# Patient Record
Sex: FEMALE | Employment: UNEMPLOYED | URBAN - METROPOLITAN AREA
[De-identification: names, ages, dates, MRNs, and addresses within clinical notes are randomized per-mention and may not be internally consistent; named-entity substitution may affect disease eponyms.]

---

## 2017-01-26 ENCOUNTER — HOSPITAL ENCOUNTER (EMERGENCY)
Age: 57
Discharge: HOME | End: 2017-01-26
Attending: EMERGENCY MEDICINE
Payer: MEDICAID

## 2017-01-26 ENCOUNTER — APPOINTMENT (OUTPATIENT)
Dept: GENERAL RADIOLOGY | Age: 57
End: 2017-01-26
Attending: EMERGENCY MEDICINE
Payer: MEDICAID

## 2017-01-26 VITALS
HEART RATE: 64 BPM | SYSTOLIC BLOOD PRESSURE: 144 MMHG | OXYGEN SATURATION: 100 % | DIASTOLIC BLOOD PRESSURE: 74 MMHG | RESPIRATION RATE: 15 BRPM | HEIGHT: 59 IN | TEMPERATURE: 97.6 F | BODY MASS INDEX: 55.04 KG/M2 | WEIGHT: 273 LBS

## 2017-01-26 DIAGNOSIS — J44.1 COPD EXACERBATION (HCC): Primary | ICD-10-CM

## 2017-01-26 DIAGNOSIS — R06.00 DYSPNEA, UNSPECIFIED TYPE: ICD-10-CM

## 2017-01-26 DIAGNOSIS — R51.9 HEADACHE, UNSPECIFIED HEADACHE TYPE: ICD-10-CM

## 2017-01-26 DIAGNOSIS — R53.1 WEAKNESS: ICD-10-CM

## 2017-01-26 LAB
ANION GAP BLD CALC-SCNC: 11 MMOL/L (ref 5–15)
ARTERIAL PATENCY WRIST A: YES
ATRIAL RATE: 69 BPM
BASE DEFICIT BLDA-SCNC: 0.5 MMOL/L
BASOPHILS # BLD AUTO: 0 K/UL (ref 0–0.1)
BASOPHILS # BLD: 1 % (ref 0–1)
BDY SITE: NORMAL
BNP SERPL-MCNC: 453 PG/ML (ref 0–125)
BUN SERPL-MCNC: 11 MG/DL (ref 6–20)
BUN/CREAT SERPL: 9 (ref 12–20)
CALCIUM SERPL-MCNC: 9.1 MG/DL (ref 8.5–10.1)
CALCULATED P AXIS, ECG09: 39 DEGREES
CALCULATED R AXIS, ECG10: -23 DEGREES
CALCULATED T AXIS, ECG11: 158 DEGREES
CHLORIDE SERPL-SCNC: 106 MMOL/L (ref 97–108)
CO2 SERPL-SCNC: 24 MMOL/L (ref 21–32)
CREAT SERPL-MCNC: 1.26 MG/DL (ref 0.55–1.02)
DIAGNOSIS, 93000: NORMAL
EOSINOPHIL # BLD: 0.1 K/UL (ref 0–0.4)
EOSINOPHIL NFR BLD: 2 % (ref 0–7)
ERYTHROCYTE [DISTWIDTH] IN BLOOD BY AUTOMATED COUNT: 15.6 % (ref 11.5–14.5)
GAS FLOW.O2 O2 DELIVERY SYS: 2 L/MIN
GLUCOSE SERPL-MCNC: 135 MG/DL (ref 65–100)
HCO3 BLDA-SCNC: 25 MMOL/L (ref 22–26)
HCT VFR BLD AUTO: 38.4 % (ref 35–47)
HGB BLD-MCNC: 12.4 G/DL (ref 11.5–16)
INR PPP: 3.6 (ref 0.9–1.1)
LYMPHOCYTES # BLD AUTO: 38 % (ref 12–49)
LYMPHOCYTES # BLD: 1.5 K/UL (ref 0.8–3.5)
MCH RBC QN AUTO: 29.7 PG (ref 26–34)
MCHC RBC AUTO-ENTMCNC: 32.3 G/DL (ref 30–36.5)
MCV RBC AUTO: 92.1 FL (ref 80–99)
MONOCYTES # BLD: 0.3 K/UL (ref 0–1)
MONOCYTES NFR BLD AUTO: 7 % (ref 5–13)
NEUTS SEG # BLD: 2 K/UL (ref 1.8–8)
NEUTS SEG NFR BLD AUTO: 52 % (ref 32–75)
P-R INTERVAL, ECG05: 158 MS
PCO2 BLDA: 42 MMHG (ref 35–45)
PH BLDA: 7.39 [PH] (ref 7.35–7.45)
PLATELET # BLD AUTO: 249 K/UL (ref 150–400)
PO2 BLDA: 88 MMHG (ref 80–100)
POTASSIUM SERPL-SCNC: 4 MMOL/L (ref 3.5–5.1)
PROTHROMBIN TIME: 37.5 SEC (ref 9–11.1)
Q-T INTERVAL, ECG07: 432 MS
QRS DURATION, ECG06: 70 MS
QTC CALCULATION (BEZET), ECG08: 462 MS
RBC # BLD AUTO: 4.17 M/UL (ref 3.8–5.2)
SAO2 % BLD: 97 % (ref 92–97)
SAO2% DEVICE SAO2% SENSOR NAME: NORMAL
SERVICE CMNT-IMP: NORMAL
SODIUM SERPL-SCNC: 141 MMOL/L (ref 136–145)
SPECIMEN SITE: NORMAL
TROPONIN I SERPL-MCNC: <0.04 NG/ML
VENTRICULAR RATE, ECG03: 69 BPM
WBC # BLD AUTO: 3.9 K/UL (ref 3.6–11)

## 2017-01-26 PROCEDURE — 36415 COLL VENOUS BLD VENIPUNCTURE: CPT | Performed by: EMERGENCY MEDICINE

## 2017-01-26 PROCEDURE — 96375 TX/PRO/DX INJ NEW DRUG ADDON: CPT

## 2017-01-26 PROCEDURE — 74011250636 HC RX REV CODE- 250/636: Performed by: EMERGENCY MEDICINE

## 2017-01-26 PROCEDURE — 77030013140 HC MSK NEB VYRM -A

## 2017-01-26 PROCEDURE — 93005 ELECTROCARDIOGRAM TRACING: CPT

## 2017-01-26 PROCEDURE — 85610 PROTHROMBIN TIME: CPT | Performed by: EMERGENCY MEDICINE

## 2017-01-26 PROCEDURE — 36600 WITHDRAWAL OF ARTERIAL BLOOD: CPT | Performed by: EMERGENCY MEDICINE

## 2017-01-26 PROCEDURE — 74011000250 HC RX REV CODE- 250: Performed by: EMERGENCY MEDICINE

## 2017-01-26 PROCEDURE — 74011250637 HC RX REV CODE- 250/637: Performed by: EMERGENCY MEDICINE

## 2017-01-26 PROCEDURE — 51798 US URINE CAPACITY MEASURE: CPT

## 2017-01-26 PROCEDURE — 99285 EMERGENCY DEPT VISIT HI MDM: CPT

## 2017-01-26 PROCEDURE — 85025 COMPLETE CBC W/AUTO DIFF WBC: CPT | Performed by: EMERGENCY MEDICINE

## 2017-01-26 PROCEDURE — 84484 ASSAY OF TROPONIN QUANT: CPT | Performed by: EMERGENCY MEDICINE

## 2017-01-26 PROCEDURE — 96374 THER/PROPH/DIAG INJ IV PUSH: CPT

## 2017-01-26 PROCEDURE — 71020 XR CHEST PA LAT: CPT

## 2017-01-26 PROCEDURE — 80048 BASIC METABOLIC PNL TOTAL CA: CPT | Performed by: EMERGENCY MEDICINE

## 2017-01-26 PROCEDURE — 94640 AIRWAY INHALATION TREATMENT: CPT

## 2017-01-26 PROCEDURE — 74011636637 HC RX REV CODE- 636/637: Performed by: EMERGENCY MEDICINE

## 2017-01-26 PROCEDURE — 82803 BLOOD GASES ANY COMBINATION: CPT | Performed by: EMERGENCY MEDICINE

## 2017-01-26 PROCEDURE — 83880 ASSAY OF NATRIURETIC PEPTIDE: CPT | Performed by: EMERGENCY MEDICINE

## 2017-01-26 RX ORDER — PREDNISONE 20 MG/1
60 TABLET ORAL
Status: COMPLETED | OUTPATIENT
Start: 2017-01-26 | End: 2017-01-26

## 2017-01-26 RX ORDER — ACETAMINOPHEN 325 MG/1
650 TABLET ORAL
Status: DISCONTINUED | OUTPATIENT
Start: 2017-01-26 | End: 2017-01-26 | Stop reason: HOSPADM

## 2017-01-26 RX ORDER — DOXYCYCLINE HYCLATE 100 MG
100 TABLET ORAL 2 TIMES DAILY
Qty: 14 TAB | Refills: 0 | Status: SHIPPED | OUTPATIENT
Start: 2017-01-26 | End: 2017-02-02

## 2017-01-26 RX ORDER — BUMETANIDE 0.25 MG/ML
1 INJECTION INTRAMUSCULAR; INTRAVENOUS
Status: COMPLETED | OUTPATIENT
Start: 2017-01-26 | End: 2017-01-26

## 2017-01-26 RX ORDER — BENZONATATE 100 MG/1
100 CAPSULE ORAL
Qty: 21 CAP | Refills: 0 | Status: SHIPPED | OUTPATIENT
Start: 2017-01-26 | End: 2017-02-02

## 2017-01-26 RX ORDER — BUTALBITAL, ACETAMINOPHEN AND CAFFEINE 50; 325; 40 MG/1; MG/1; MG/1
1 TABLET ORAL
Status: COMPLETED | OUTPATIENT
Start: 2017-01-26 | End: 2017-01-26

## 2017-01-26 RX ORDER — IPRATROPIUM BROMIDE AND ALBUTEROL SULFATE 2.5; .5 MG/3ML; MG/3ML
3 SOLUTION RESPIRATORY (INHALATION)
Status: COMPLETED | OUTPATIENT
Start: 2017-01-26 | End: 2017-01-26

## 2017-01-26 RX ORDER — GUAIFENESIN 100 MG/5ML
200 SOLUTION ORAL
Status: COMPLETED | OUTPATIENT
Start: 2017-01-26 | End: 2017-01-26

## 2017-01-26 RX ORDER — PREDNISONE 20 MG/1
60 TABLET ORAL DAILY
Qty: 15 TAB | Refills: 0 | Status: SHIPPED | OUTPATIENT
Start: 2017-01-26 | End: 2017-01-31

## 2017-01-26 RX ADMIN — IPRATROPIUM BROMIDE AND ALBUTEROL SULFATE 3 ML: .5; 2.5 SOLUTION RESPIRATORY (INHALATION) at 13:36

## 2017-01-26 RX ADMIN — PREDNISONE 60 MG: 20 TABLET ORAL at 16:23

## 2017-01-26 RX ADMIN — BUTALBITAL, ACETAMINOPHEN, AND CAFFEINE 1 TABLET: 50; 325; 40 TABLET ORAL at 14:18

## 2017-01-26 RX ADMIN — BUMETANIDE 1 MG: 0.25 INJECTION, SOLUTION INTRAMUSCULAR; INTRAVENOUS at 14:18

## 2017-01-26 RX ADMIN — METHYLPREDNISOLONE SODIUM SUCCINATE 125 MG: 125 INJECTION, POWDER, FOR SOLUTION INTRAMUSCULAR; INTRAVENOUS at 13:36

## 2017-01-26 RX ADMIN — GUAIFENESIN 200 MG: 100 SOLUTION ORAL at 16:23

## 2017-01-26 NOTE — PROGRESS NOTES
1/26/2017   CARE MANAGEMENT NOTE:  CM received a consult from ER physician re: needs assessment. Pt is familiar to me from multiple hospital admissions in in the past.  Her last hospital admit was 8/16/16. Pt continues to live with her  in [place], [place]. There are about four entry steps. PT is ambulatory with a rolling walker, and she reports being indepn with ADLs. Pt does not drive. She has had several home healthcare agencies in the past and currently has [facility] [place] [facility] for PT. This agency also monitors pt's Trilogy device. DME in the home consists of a cane, RW, nebulizer, Cpap, Trilogy, and oxgyen thru [facility]. PCP is Dr. Georgina Oquendo. The PCP office is within walking distance from her home. Pt expressed her medical concerns with the MD and she agrees to return home via ambulance. Pt requests a meal and karly while she waits for transportation - ER liaison to provide meal for pt.   Nicky

## 2017-01-26 NOTE — ED TRIAGE NOTES
61 y/o female presents to ED via ems complaining of sob, non productive cough, congestion, HA, and left sided chest pain x 4 days.

## 2017-01-26 NOTE — CONSULTS
Cardiology Consultation Note                                 Darly Saunders MD, [address], [place], [id number] [place] [address]                         Phone [phone]; Fax [phone]            2017 11:44 AM  Carmen Reyna MD  :  1960   MRN:  [id number]     CC: SOB  Reason for consult: SOB  Admission Diagnosis: There are no admission diagnoses documented for this encounter. Requesting MD:     ASSESSMENT/RECOMMENDATIONS:   1)SOB/Diastolic dysfunction with no evidence of HF  -she describes the inability to urinate and progressive abdomina bloating.   -based on objective data her BNP is one of the lowest she has had in years in the 400 range  -no evidence of edema on CXR  -I would presume her sx's may be more likely related to her underlying COPD and obesity. 2) Palpitations  -she states her heart is beating fast and irregular.  -on the monitor her rhythm  Is normal  -she is anticoagulated and I am unsure if this is for Afib as I see no objective data of this anywhere in CC    3) Morbid obesity  -she states she use to be 500 lbs  -her immobility will result in earlier death and I have reviewed this with her. 4) Hypertension  -BP is elevated   -good control of BP may assist in forward flow and reduce increased pulmonary pressures and volume reducing sensation of SOB. 5) Dyslipidemia  -have been at goal  -no evidence of CAD other than the fact she does have diabetes    6) COPD    7) MARCO ANTONIO   -states she wears CPAP religiously and does not miss      There is no reason from a cardiac standpoint to admit based on data gathered. She needs to follow up with Dr. Zechariah Styles in the next week or two. Terry Barrett is a 60 y.o. female I am seeing for diabetes, hypertension and Diastolic HF.  Symptoms include: chest pressure/discomfort, palpitations, dyspnea on exertion  Cardiac risk factors: diabetes mellitus, obesity, sedentary life style, hypertension, post-menopausal.she is being seen in the Er for multiple sx's. She states she is SOB, having palpations, chest pain, leg/hip pain. \" my  says if I don't get admitted I am not coming back here\". She states her SOB has worsened over several weeks. She has noted abdominal bloating. Wears CPAP and O2 at home. She tried increasing her Bumex to 2 pills and urine output down now and mouth is very dry. She alos has chest tightness at times and troponin here is negative. Allergies   Allergen Reactions    Aspirin Hives, Shortness of Breath and Swelling     Throat swells up. Does not use aleve or ibuprofen because of this    Pcn [Penicillins] Hives and Shortness of Breath     Is not sure if she has ever used Cephs    Zetia [Ezetimibe] Shortness of Breath     \"Throat closes up all the way\"    Zithromax [Azithromycin] Hives and Shortness of Breath     \" Myles Collingsworth closes up \"    Zofran [Ondansetron Hcl (Pf)] Hives and Itching     Tolerates promethazine         Past Medical History   Diagnosis Date    Arthritis     Asthma     Blindness of right eye with low vision in contralateral eye     Bronchitis     CAD (coronary artery disease)      MI yrs ago per patient    Cataracts, bilateral      Injections in rt eye    Cellulitis     Chronic kidney disease      stage 1 CKD    COPD (chronic obstructive pulmonary disease) (HCC)     Diastolic CHF, chronic (HCC)     GERD (gastroesophageal reflux disease)     Hypertension     Hypothyroidism     Migraines     Mitral stenosis      severe MAC and moderate mitral stenosis    Obesity, Class III, BMI 40-49.9 (morbid obesity) ([facility] [address] 3/19/2012    On home oxygen therapy     Sleep apnea      CPAP    Spinal stenosis     TIA (transient ischemic attack) 2013     leg weakness, slurred speech.     Tobacco abuse 3/19/2012     quit    Type II or unspecified type diabetes mellitus without mention of complication, uncontrolled     UTI (urinary tract infection)         Past Surgical History   Procedure Laterality Date    Hx orthopaedic       spinal fusion     Pr abdomen surgery proc unlisted       fibroids removed,     Hx hysterectomy      Hx colostomy      Hx endoscopy      Hx cataract removal Left     Hx cholecystectomy          . Home Medications:  Prior to Admission Medications   Prescriptions Last Dose Informant Patient Reported? Taking? HUMALOG 100 unit/mL injection  Self Yes No   Si-2 Units by SubCUTAneous route Before breakfast, lunch, and dinner. Sliding scale   HYDROcodone-acetaminophen (NORCO) 7.5-325 mg per tablet   No No   Sig: Take 1 Tab by mouth every six (6) hours as needed for Pain. Max Daily Amount: 4 Tabs. Do not drive for 6 hours after taking, may impair ability to drive   Patient taking differently: Take 1 Tab by mouth every four (4) hours as needed for Pain (Not to exceed 6 pills a day). Do not drive for 6 hours after taking, may impair ability to drive   albuterol (PROVENTIL HFA, VENTOLIN HFA, PROAIR HFA) 90 mcg/actuation inhaler  Self Yes No   Sig: Take 2 Puffs by inhalation four (4) times daily. albuterol-ipratropium (DUO-NEB) 2.5 mg-0.5 mg/3 ml nebu   No No   Sig: 3 mL by Nebulization route every four (4) hours as needed. atorvastatin (LIPITOR) 40 mg tablet  Self Yes No   Sig: Take 40 mg by mouth daily. cloNIDine HCl (CATAPRES) 0.2 mg tablet  Self Yes No   Sig: Take 0.2 mg by mouth two (2) times a day. cloNIDine HCl (CATAPRES) 0.2 mg tablet   No No   Sig: Take 1 Tab by mouth two (2) times a day. diltiazem CD (CARDIZEM CD) 240 mg ER capsule  Self No No   Sig: Take 1 Cap by mouth daily. ergocalciferol (ERGOCALCIFEROL) 50,000 unit capsule  Self Yes No   Sig: Take 50,000 Units by mouth every Monday. fluticasone-salmeterol (ADVAIR) 500-50 mcg/dose diskus inhaler  Self No No   Sig: Take 1 puff by inhalation every twelve (12) hours.    furosemide (LASIX) 20 mg tablet   No No   Sig: Take 1 Tab by mouth as needed. Only when swelling   insulin glargine (LANTUS) 100 unit/mL injection   No No   Si Units by SubCUTAneous route daily. levothyroxine (SYNTHROID) 175 mcg tablet  Self No No   Sig: Take 1 Tab by mouth Daily (before breakfast). lisinopril (PRINIVIL, ZESTRIL) 5 mg tablet  Self No No   Sig: Take 1 Tab by mouth daily. loperamide (IMMODIUM) 2 mg tablet  Self No No   Sig: Take 1 tab with each bowel movement for diarrhea up to 4 times a day   omeprazole (PRILOSEC) 20 mg capsule  Self Yes No   Sig: Take 20 mg by mouth every morning. Takes at 06:30   predniSONE (DELTASONE) 20 mg tablet   No No   Sig: Take 2 Tabs by mouth daily (with breakfast). promethazine (PHENERGAN) 25 mg tablet  Self No No   Sig: Take 1 Tab by mouth every six (6) hours as needed. tiotropium (SPIRIVA) 18 mcg inhalation capsule  Self No No   Sig: Take 1 Cap by inhalation daily. warfarin (COUMADIN) 5 mg tablet   No No   Sig: Take 1 Tab by mouth daily. Facility-Administered Medications: None       Hospital Medications:  Current Facility-Administered Medications   Medication Dose Route Frequency    acetaminophen (TYLENOL) tablet 650 mg  650 mg Oral NOW    predniSONE (DELTASONE) tablet 60 mg  60 mg Oral NOW     Current Outpatient Prescriptions   Medication Sig    cloNIDine HCl (CATAPRES) 0.2 mg tablet Take 1 Tab by mouth two (2) times a day.  insulin glargine (LANTUS) 100 unit/mL injection 36 Units by SubCUTAneous route daily.  furosemide (LASIX) 20 mg tablet Take 1 Tab by mouth as needed. Only when swelling    warfarin (COUMADIN) 5 mg tablet Take 1 Tab by mouth daily.  predniSONE (DELTASONE) 20 mg tablet Take 2 Tabs by mouth daily (with breakfast).  HYDROcodone-acetaminophen (NORCO) 7.5-325 mg per tablet Take 1 Tab by mouth every six (6) hours as needed for Pain. Max Daily Amount: 4 Tabs.  Do not drive for 6 hours after taking, may impair ability to drive (Patient taking differently: Take 1 Tab by mouth every four (4) hours as needed for Pain (Not to exceed 6 pills a day). Do not drive for 6 hours after taking, may impair ability to drive)    albuterol-ipratropium (DUO-NEB) 2.5 mg-0.5 mg/3 ml nebu 3 mL by Nebulization route every four (4) hours as needed.  promethazine (PHENERGAN) 25 mg tablet Take 1 Tab by mouth every six (6) hours as needed.  diltiazem CD (CARDIZEM CD) 240 mg ER capsule Take 1 Cap by mouth daily.  albuterol (PROVENTIL HFA, VENTOLIN HFA, PROAIR HFA) 90 mcg/actuation inhaler Take 2 Puffs by inhalation four (4) times daily.  atorvastatin (LIPITOR) 40 mg tablet Take 40 mg by mouth daily.  loperamide (IMMODIUM) 2 mg tablet Take 1 tab with each bowel movement for diarrhea up to 4 times a day    levothyroxine (SYNTHROID) 175 mcg tablet Take 1 Tab by mouth Daily (before breakfast).  lisinopril (PRINIVIL, ZESTRIL) 5 mg tablet Take 1 Tab by mouth daily.  ergocalciferol (ERGOCALCIFEROL) 50,000 unit capsule Take 50,000 Units by mouth every Monday.  omeprazole (PRILOSEC) 20 mg capsule Take 20 mg by mouth every morning. Takes at 06:30    HUMALOG 100 unit/mL injection 0-2 Units by SubCUTAneous route Before breakfast, lunch, and dinner. Sliding scale    tiotropium (SPIRIVA) 18 mcg inhalation capsule Take 1 Cap by inhalation daily.  fluticasone-salmeterol (ADVAIR) 500-50 mcg/dose diskus inhaler Take 1 puff by inhalation every twelve (12) hours.  cloNIDine HCl (CATAPRES) 0.2 mg tablet Take 0.2 mg by mouth two (2) times a day.           OBJECTIVE       Laboratory and Imaging have been reviewed and are notable for      ECG:  Date:  normal EKG, normal sinus rhythm, unchanged from previous tracings      Diagnostic Tests:     Recent Labs      01/26/17   [facility]  <0.04     Recent Labs      01/26/17   1322   NA  141   K  4.0   CO2  24   BUN  11   CREA  1.26*   GLU  135*   WBC  3.9   HGB  12.4   HCT  38.4   PLT  249         Cardiac work up to date:    ECHO  (01/29/09): EF 60%; mild concentric LVH, possible mild diastolic dysfunction,   (8657/59): EF 55-60%, no WMA, LA mildly dilated   (13): EF 89-92%, Grade 2 diastolic dysfunction, wall thickeness moderately increased, LA moderately dilated, mitral valve calcification   (14)- EF 65%, mod concentric hypertrophy, mod dilated LA, mod MV calcification, mild TR  (3/24/15)- EF 75%, mild concentric hypertrophy. G1DD, LA moderately dilated, marked MV annular calcification, mild mitral stenosis.   (16) - TDS. mild LVH. LVEF 55-60%. Mod-severe LAE. Marked annular calcification and mod MV thickening with reduced separation and moderate mitral stenosis. MV meanPG was 8.2 mmHg. 2)Nuclear stress   3/25/14- Normal Lexiscan gated SPECT myocardial perfusion study. LVEF 59%  16- lexiscan Normal, LVEF 70%, no inducible ischemia       3)EKG 16 shows afib with RVR, rate 122, LVH, NSST changes    4) Cholesterol  (4/15/16): , HDL 90, LDL 74, TG 60               Social History:  Social History   Substance Use Topics    Smoking status: Former Smoker     Packs/day: 2.00     Years: 7.00     Quit date: 2010    Smokeless tobacco: Never Used    Alcohol use No       Family History:  Family History   Problem Relation Age of Onset    Hypertension Mother     Asthma Mother     Diabetes Mother     Other Father 27     accident fire-rescued by dad who    Devin Osuna Sickle Cell Anemia Brother 16      playing football   Devin Osuna Other Sister      e coli       Review of Symptoms:  A comprehensive review of systems was negative except for that written in the HPI. Physical Exam:      Visit Vitals    /63    Pulse (!) 59    Temp 97.6 °F (36.4 °C)    Resp 13    Ht 4' 11\" (1.499 m)    Wt 273 lb (123.8 kg)    LMP 1985    SpO2 96%    BMI 55.14 kg/m2     General Appearance: Morbid obesity well nourished,alert and oriented x 3, and individual in no acute distress.    Ears/Nose/Mouth/Throat:   Hearing grossly normal.Normal oral mucosa,no scleral icterus     Neck: Supple no JVD or bruits,no cervical lymphadenopathy   Chest:   Lungs clear to auscultation anterior   Cardiovascular:  Regular rate and rhythm,   Abdomen:   Soft, non-tender, bowel sounds are active. No abdominal bruits   Extremities: No edema bilaterally. Pulses detected, no varicosities   Skin: Warm and dry. No bruising   Neuro:                                                  moving all extremities  Poor insight           I have discussed the diagnosis with the patient and the intended plan as seen in the above orders. Questions were answered concerning future plans. I have discussed medication side effects and warnings with the patient as well. Vance Bauer is in agreement to the plan listed above and wishes to proceed. she  was instructed not to smoke, eat heart healthy diet  and to exercise. Thank you for this consult.       Jules Wick MD

## 2017-01-26 NOTE — ED PROVIDER NOTES
HPI Comments: 60 y.o. female with extensive past medical history, please see list, significant for COPD, CHF, asthma, migraines, HTN, CAD, and sleep apnea who presents from home via EMS with chief complaint of SOB. Pt states SOB onset 4 days ago (1/22/17) with unproductive, dry, rattling cough. Pt states cough is exacerbated with inhalation. Pt states she has used her inhaler 6 times today with no relief. She last used her inhaler at 1045 this morning. Pt states she is on 2L O2 at home constantly. Pt also wears a cpap mask at night and sleeps on an elevated hospital bed. Pt also complains of associated chest tightness that is constant. Pt states chest tightness is exacerbated with walking. Pt states she has gained 5 lbs that she attributes to water retention. Pt states she increased her dose of Bumex to 2 pills every day for the last 2 days (1/24/17) with no relief. Pt further complains of generalized weakness with headache and night sweats. Pt is compliant with coumadin for CHF. Pt states her last dose of steroids was 3 weeks ago. Pt denies frequent COPD exacerbation. Pt is ambulatory at baseline with a walker. Pt denies a history of intubation due to COPD exacerbation. Pt denies a history of cardiac stents. Pt denies recent illness. Pt denies fever. There are no other acute medical concerns at this time. Social hx: former tobacco smoker; denies EtOH use; denies illicit drug use  PCP: Luis Díaz MD  Cardiologist: Dr. Bryant Laughlin  Pulmonologist: Dr. Jacob Otero    Note written by Everton Squires, as dictated by Eusebia Graham MD 12:05 PM      The history is provided by the patient.         Past Medical History:   Diagnosis Date    Arthritis     Asthma     Blindness of right eye with low vision in contralateral eye     Bronchitis     CAD (coronary artery disease)      MI yrs ago per patient    Cataracts, bilateral      Injections in rt eye    Cellulitis     Chronic kidney disease      stage 1 CKD    COPD (chronic obstructive pulmonary disease) (HCC)     Diastolic CHF, chronic (HCC)     GERD (gastroesophageal reflux disease)     Hypertension     Hypothyroidism     Migraines     Mitral stenosis      severe MAC and moderate mitral stenosis    Obesity, Class III, BMI 40-49.9 (mor[facility] 3/19/2012    On home oxygen therapy     Sleep apnea      CPAP    Spinal stenosis     TIA (transient ischemic attack)      leg weakness, slurred speech.  Tobacco abuse 3/19/2012     quit    Type II or unspecified type diabetes mellitus without mention of complication, uncontrolled     UTI (urinary tract infection)        Past Surgical History:   Procedure Laterality Date    Hx orthopaedic       spinal fusion     Pr abdomen surgery proc unlisted       fibroids removed,     Hx hysterectomy      Hx colostomy      Hx endoscopy      Hx cataract removal Left     Hx cholecystectomy           Family History:   Problem Relation Age of Onset    Hypertension Mother     Asthma Mother     Diabetes Mother     Other Father 27     accident fire-rescued by dad who    Annabella Miners Sickle Cell Anemia Brother 16      playing football   Annabella Miners Other Sister      e coli       Social History     Social History    Marital status:      Spouse name: N/A    Number of children: N/A    Years of education: N/A     Occupational History    Not on file. Social History Main Topics    Smoking status: Former Smoker     Packs/day: 2.00     Years: 7.00     Quit date: 2010    Smokeless tobacco: Never Used    Alcohol use No    Drug use: No    Sexual activity: Not Currently     Other Topics Concern    Not on file     Social History Narrative         ALLERGIES: Aspirin; Pcn [penicillins]; Zetia [ezetimibe]; Zithromax [azithromycin]; and Zofran [ondansetron hcl (pf)]    Review of Systems   Constitutional: Positive for diaphoresis and unexpected weight change. Negative for fever.    Respiratory: Positive for cough, chest tightness and shortness of breath. Gastrointestinal: Positive for abdominal distention. Neurological: Positive for weakness and headaches. All other systems reviewed and are negative. Vitals:    01/26/17 1144   BP: 111/63   Pulse: 65   Resp: 11   Temp: 97.6 °F (36.4 °C)   SpO2: 99%   Weight: 123.8 kg (273 lb)   Height: 4' 11\" (1.499 m)            Physical Exam   Constitutional: She is oriented to person, place, and time. She appears well-developed and well-nourished. No distress. HENT:   Head: Normocephalic and atraumatic. Eyes: Conjunctivae are normal.   Neck: Normal range of motion. Cardiovascular: Normal rate, regular rhythm, normal heart sounds and intact distal pulses. Exam reveals no friction rub. No murmur heard. Pulmonary/Chest: Effort normal. No respiratory distress. She has wheezes. She has no rales. She exhibits no tenderness. Scant expiratory wheezes   Abdominal: Soft. Bowel sounds are normal. She exhibits no distension. There is no tenderness. There is no rebound and no guarding. Musculoskeletal: Normal range of motion. She exhibits edema. She exhibits no tenderness. Neurological: She is alert and oriented to person, place, and time. She exhibits normal muscle tone. Coordination normal.   Skin: Skin is warm and dry. She is not diaphoretic. No pallor. Psychiatric: She has a normal mood and affect. Her behavior is normal.   Nursing note and vitals reviewed. MDM  Number of Diagnoses or Management Options  COPD exacerbation ([facility] [address]):   Dyspnea, unspecified type:   Headache, unspecified headache type:   Weakness:   Diagnosis management comments: Copd vs chf vs PNA. ekg with new inversions concerning as well. Given decrease urine outpt eval for renal failure.  Will likely need admission given increasing weakness and inability to ambulate       Amount and/or Complexity of Data Reviewed  Clinical lab tests: ordered and reviewed  Tests in the radiology section of CPT®: ordered and reviewed  Discuss the patient with other providers: yes (Cardiology and case managment)  Independent visualization of images, tracings, or specimens: yes (ekg  )    Patient Progress  Patient progress: stable    ED Course       Procedures  EKG interpretation: (Preliminary)  Rhythm: normal sinus rhythm; and regular . Rate (approx.): 70; Axis: left axis deviation; P wave: normal; QRS interval: normal ; ST/T wave: T wave inverted; in  Lead: v4-v6 inversions new since 8/16    PROGRESS NOTE:  1:41 PM  Pt's refused Tylenol for headache and is requesting Fioricet. 2:55 PM  Pt has no wheezing on exam. Pt states SOB is worse when she gets up and ambulates. Not chf pt actually with lowest bnp ever and likely slightly dry. Treat copd. Case management will see the patient. CONSULT NOTE:  2:52 PM Prieto Hansen MD spoke with Dr. Lamine Montejo, Consult for Cardiology. Discussed available diagnostic tests and clinical findings. He is in agreement with care plans as outlined. Dr. Lamine Montejo will see the patient. 3:38 PM  Dr. Lamine Montejo saw the patient. He believes the pt is dry. He will not change her diuretics at this time. Pt will be instructed to follow-up with Dr. Miladys Wing in 2 weeks. 3:44 PM  Pt now complains of difficulty emptying her bladder. Pt's bladder will be scanned to ensure bladder is emptying appropriately. Pt's kidney function is normal. Pt also states she does not want to take Prednisone because it causes her to retain fluid. Pt was informed that the prescription is for 5 days and important to treat COPD exacerbation. Pt denies needing a refill for her albuterol. 4:52 PM  Pt successfully emptied her bladder when brought to the bedside commode. Pt will be discharged.

## 2017-01-26 NOTE — ED NOTES
Pt urinated large amount of clear, yellow urine in bedside commode. Pt now eating tyron crackers and drinking diet ginger ale. Pt is awaiting transport.

## 2017-01-26 NOTE — DISCHARGE INSTRUCTIONS
We hope that we have addressed all of your medical concerns. The examination and treatment you received in the Emergency Department were for an emergent problem and were not intended as complete care. It is important that you follow up with your healthcare provider(s) for ongoing care. If your symptoms worsen or do not improve as expected, and you are unable to reach your usual health care provider(s), you should return to the Emergency Department. Today's healthcare is undergoing tremendous change, and patient satisfaction surveys are one of the many tools to assess the quality of medical care. You may receive a survey from the [employer] regarding your experience in the Emergency Department. I hope that your experience has been completely positive, particularly the medical care that I provided. As such, please participate in the survey; anything less than excellent does not meet my expectations or intentions. [facility] and [address] [place] [address] participate in nationally recognized quality of care measures. If your blood pressure is greater than 120/80, as reported below, we urge that you seek medical care to address the potential of high blood pressure, commonly known as hypertension. Hypertension can be hereditary or can be caused by certain medical conditions, pain, stress, or \"white coat syndrome. \"       Please make an appointment with your health care provider(s) for follow up of your Emergency Department visit. VITALS:   Patient Vitals for the past 8 hrs:   Temp Pulse Resp BP SpO2   01/26/17 1430 - (!) 59 13 115/63 96 %   01/26/17 1418 - 65 - 118/58 -   01/26/17 1400 - 66 13 118/58 96 %   01/26/17 1144 97.6 °F (36.4 °C) 65 11 111/63 99 %          Thank you for allowing us to provide you with medical care today. We realize that you have many choices for your emergency care needs.   Please choose us in the future for any continued health care needs. Lamine Church MD    [facility], [facility].   Office: [phone]            Recent Results (from the past 24 hour(s))   EKG, 12 LEAD, INITIAL    Collection Time: 01/26/17 11:55 AM   Result Value Ref Range    Ventricular Rate 69 BPM    Atrial Rate 69 BPM    P-R Interval 158 ms    QRS Duration 70 ms    Q-T Interval 432 ms    QTC Calculation (Bezet) 462 ms    Calculated P Axis 39 degrees    Calculated R Axis -23 degrees    Calculated T Axis 158 degrees    Diagnosis       Normal sinus rhythm  Minimal voltage criteria for LVH, may be normal variant  T wave abnormality, consider lateral ischemia  Prolonged QT  Abnormal ECG  When compared with ECG of 16-AUG-2016 08:33,  premature ventricular complexes are no longer present  premature atrial complexes are no longer present  T wave inversion more evident in Lateral leads     CBC WITH AUTOMATED DIFF    Collection Time: 01/26/17  1:22 PM   Result Value Ref Range    WBC 3.9 3.6 - 11.0 K/uL    RBC 4.17 3.80 - 5.20 M/uL    HGB 12.4 11.5 - 16.0 g/dL    HCT 38.4 35.0 - 47.0 %    MCV 92.1 80.0 - 99.0 FL    MCH 29.7 26.0 - 34.0 PG    MCHC 32.3 30.0 - 36.5 g/dL    RDW 15.6 (H) 11.5 - 14.5 %    PLATELET 094 983 - 366 K/uL    NEUTROPHILS 52 32 - 75 %    LYMPHOCYTES 38 12 - 49 %    MONOCYTES 7 5 - 13 %    EOSINOPHILS 2 0 - 7 %    BASOPHILS 1 0 - 1 %    ABS. NEUTROPHILS 2.0 1.8 - 8.0 K/UL    ABS. LYMPHOCYTES 1.5 0.8 - 3.5 K/UL    ABS. MONOCYTES 0.3 0.0 - 1.0 K/UL    ABS. EOSINOPHILS 0.1 0.0 - 0.4 K/UL    ABS.  BASOPHILS 0.0 0.0 - 0.1 K/UL   METABOLIC PANEL, BASIC    Collection Time: 01/26/17  1:22 PM   Result Value Ref Range    Sodium 141 136 - 145 mmol/L    Potassium 4.0 3.5 - 5.1 mmol/L    Chloride 106 97 - 108 mmol/L    CO2 24 21 - 32 mmol/L    Anion gap 11 5 - 15 mmol/L    Glucose 135 (H) 65 - 100 mg/dL    BUN 11 6 - 20 MG/DL    Creatinine 1.26 (H) 0.55 - 1.02 MG/DL    BUN/Creatinine ratio 9 (L) 12 - 20      GFR est AA 53 (L) >60 ml/min/1.73m2    GFR est non-AA 44 (L) >60 ml/min/1.73m2    Calcium 9.1 8.5 - 10.1 MG/DL   PRO-BNP    Collection Time: 01/26/17  1:22 PM   Result Value Ref Range    NT pro- (H) 0 - 125 PG/ML   TROPONIN I    Collection Time: 01/26/17  1:22 PM   Result Value Ref Range    Troponin-I, Qt. <0.04 <0.05 ng/mL   PROTHROMBIN TIME + INR    Collection Time: 01/26/17  1:22 PM   Result Value Ref Range    INR 3.6 (H) 0.9 - 1.1      Prothrombin time 37.5 (H) 9.0 - 11.1 sec   BLOOD GAS, ARTERIAL    Collection Time: 01/26/17  3:05 PM   Result Value Ref Range    pH 7.39 7.35 - 7.45      PCO2 42 35.0 - 45.0 mmHg    PO2 88 80 - 100 mmHg    O2 SAT 97 92 - 97 %    BICARBONATE 25 22 - 26 mmol/L    BASE DEFICIT 0.5 mmol/L    O2 METHOD NASAL O2      O2 FLOW RATE 2.00 L/min    Sample source ARTERIAL      SITE RIGHT RADIAL      TRAVON'S TEST YES      Critical [facility]         Xr Chest Pa Lat    Result Date: 1/26/2017  EXAM:  XR CHEST PA LAT INDICATION:   sob cough COMPARISON: Chest x-ray 8/16/2016. Candance Huger FINDINGS: PA and lateral radiographs of the chest demonstrate grossly clear appearance of lungs on examination compromised by poor penetration of patient habitus. The cardia silhouette appears mildly enlarged. Atelectatic calcination is affect the aortic arch. .  The chest wall structures and visualized upper abdomen show no acute findings with incidental note of degenerative spine and shoulder changes. IMPRESSION: No acute findings on examination compromised by poor penetration of patient habitus. Chronic Obstructive Pulmonary Disease (COPD) Flare-Ups: Care Instructions  Your Care Instructions    Chronic obstructive pulmonary disease (COPD) is a lung disease that makes it hard to breathe. It is caused by damage to the lungs over many years, usually from smoking.   COPD is often a mix of two diseases:  · Chronic bronchitis: The airways that carry air to the lungs (bronchial tubes) get inflamed and make a lot of mucus. This can narrow or block the airways. · Emphysema: In a healthy person, the tiny air sacs in the lungs are like balloons. As you breathe in and out, they get bigger and smaller to move air through your lungs. But with emphysema, these air sacs are damaged and lose their stretch. Less air gets in and out of the lungs. Many people with COPD have attacks called flare-ups or exacerbations. This is when your usual symptoms quickly get worse and stay worse. The doctor has checked you carefully. But problems can develop later. If you notice any problems or new symptoms, get medical treatment right away. Follow-up care is a key part of your treatment and safety. Be sure to make and go to all appointments, and call your doctor if you are having problems. It's also a good idea to know your test results and keep a list of the medicines you take. How can you care for yourself at home? · Be safe with medicines. Take your medicines exactly as prescribed. Call your doctor if you think you are having a problem with your medicine. You may be taking medicines such as:  ¨ Bronchodilators. These help open your airways and make breathing easier. ¨ Corticosteroids. These reduce airway inflammation. They may be given as pills, in a vein, or in an inhaled form. You may go home with pills in addition to an inhaler that you already use. · A spacer may help you get more inhaled medicine to your lungs. Ask your doctor or pharmacist if a spacer is right for you. If it is, ask how to use it properly. · If your doctor prescribed antibiotics, take them as directed. Do not stop taking them just because you feel better. You need to take the full course of antibiotics. · If your doctor prescribed oxygen, use the flow rate your doctor has recommended. Do not change it without talking to your doctor first.  · Do not smoke. Smoking makes COPD worse.  If you need help quitting, talk to your doctor about stop-smoking programs and medicines. These can increase your chances of quitting for good. When should you call for help? Call 911 anytime you think you may need emergency care. For example, call if:  · You have severe trouble breathing. Call your doctor now or seek immediate medical care if:  · You have new or worse trouble breathing. · Your coughing or wheezing gets worse. · You cough up dark brown or bloody mucus (sputum). · You have a new or higher fever. Watch closely for changes in your health, and be sure to contact your doctor if:  · You notice more mucus or a change in the color of your mucus. · You need to use your antibiotic or steroid pills. · You do not get better as expected. Where can you learn more? Go to http://tano-yefri.info/. Enter R994 in the search box to learn more about \"Chronic Obstructive Pulmonary Disease (COPD) Flare-Ups: Care Instructions. \"  Current as of: July 21, 2016  Content Version: 11.1  © 5848-7324 [employer]. Care instructions adapted under license by [employer] (which disclaims liability or warranty for this information). If you have questions about a medical condition or this instruction, always ask your healthcare professional. [place] [address] any warranty or liability for your use of this information. Head or Face Pain: Care Instructions  Your Care Instructions  Common causes of head or face pain are allergies, stress, and injuries. Other causes include tooth problems and sinus infections. Eating certain foods, such as chocolate or cheese, or drinking certain liquids, such as coffee or cola, can cause head pain for some people. If you have mild head pain, you may not need treatment. It is important to watch your symptoms and talk to your doctor if your pain continues or gets worse. Follow-up care is a key part of your treatment and safety.  Be sure to make and go to all appointments, and call your doctor if you are having problems. It's also a good idea to know your test results and keep a list of the medicines you take. How can you care for yourself at home? · Take pain medicines exactly as directed. ¨ If the doctor gave you a prescription medicine for pain, take it as prescribed. ¨ If you are not taking a prescription pain medicine, ask your doctor if you can take an over-the-counter pain medicine. · Take it easy for the next few days or longer if you are not feeling well. · Use a warm, moist towel or heating pad set on low to relax tight muscles in your shoulder and neck. Have someone gently massage your neck and shoulders. · Put ice or a cold pack on the area for 10 to 20 minutes at a time. Put a thin cloth between the ice and your skin. When should you call for help? Call 911 anytime you think you may need emergency care. For example, call if:  · You have twitching, jerking, or a seizure. · You passed out (lost consciousness). · You have symptoms of a stroke. These may include:  ¨ Sudden numbness, tingling, weakness, or loss of movement in your face, arm, or leg, especially on only one side of your body. ¨ Sudden vision changes. ¨ Sudden trouble speaking. ¨ Sudden confusion or trouble understanding simple statements. ¨ Sudden problems with walking or balance. ¨ A sudden, severe headache that is different from past headaches. · You have jaw pain and pain in your chest, shoulder, neck, or arm. Call your doctor now or seek immediate medical care if:  · You have a fever with a stiff neck or a severe headache. · You have nausea and vomiting, or you cannot keep food or liquids down. Watch closely for changes in your health, and be sure to contact your doctor if:  · Your head or face pain does not get better as expected. Where can you learn more? Go to http://tano-yefri.info/. Enter P568 in the search box to learn more about \"Head or Face Pain: Care Instructions. \"  Current as of:  May 27, 2016  Content Version: 11.1  © 9913-4328 [employer]. Care instructions adapted under license by [employer] (which disclaims liability or warranty for this information). If you have questions about a medical condition or this instruction, always ask your healthcare professional. [address] any warranty or liability for your use of this information. Fatigue: Care Instructions  Your Care Instructions  Fatigue is a feeling of tiredness, exhaustion, or lack of energy. You may feel fatigue because of too much or not enough activity. It can also come from stress, lack of sleep, boredom, and poor diet. Many medical problems, such as viral infections, can cause fatigue. Emotional problems, especially depression, are often the cause of fatigue. Fatigue is most often a symptom of another problem. Treatment for fatigue depends on the cause. For example, if you have fatigue because you have a certain health problem, treating this problem also treats your fatigue. If depression or anxiety is the cause, treatment may help. Follow-up care is a key part of your treatment and safety. Be sure to make and go to all appointments, and call your doctor if you are having problems. It's also a good idea to know your test results and keep a list of the medicines you take. How can you care for yourself at home? · Get regular exercise. But don't overdo it. Go back and forth between rest and exercise. · Get plenty of rest.  · Eat a healthy diet. Do not skip meals, especially breakfast.  · Reduce your use of caffeine, tobacco, and alcohol. Caffeine is most often found in coffee, tea, cola drinks, and chocolate. · Limit medicines that can cause fatigue. This includes tranquilizers and cold and allergy medicines. When should you call for help? Watch closely for changes in your health, and be sure to contact your doctor if:  · You have new symptoms such as fever or a rash.   · Your fatigue gets worse. · You have been feeling down, depressed, or hopeless. Or you may have lost interest in things that you usually enjoy. · You are not getting better as expected. Where can you learn more? Go to http://tano-yefri.info/. Enter S266 in the search box to learn more about \"Fatigue: Care Instructions. \"  Current as of: May 27, 2016  Content Version: 11.1  © 2006-2016 [employer]. Care instructions adapted under license by [employer] (which disclaims liability or warranty for this information). If you have questions about a medical condition or this instruction, always ask your healthcare professional. [address] any warranty or liability for your use of this information. Weakness: Care Instructions  Your Care Instructions  Weakness is a lack of physical or muscle strength. You may feel that you need to make extra effort to move your arms, legs, or other muscles. Generalized weakness means that you feel weak in most areas of your body. Another type of weakness may affect just one muscle or group of muscles. You may feel weak and tired after you have done too much activity, such as taking an extra-long hike. This is not a serious problem. It often goes away on its own. Feeling weak can also be caused by medical conditions like thyroid problems, depression, or a virus. Sometimes the cause can be serious. Your doctor may want to do more tests to try to find the cause of the weakness. The doctor has checked you carefully, but problems can develop later. If you notice any problems or new symptoms, get medical treatment right away. Follow-up care is a key part of your treatment and safety. Be sure to make and go to all appointments, and call your doctor if you are having problems. It's also a good idea to know your test results and keep a list of the medicines you take. How can you care for yourself at home?   · Rest when you feel tired. · Be safe with medicines. If your doctor prescribed medicine, take it exactly as prescribed. Call your doctor if you think you are having a problem with your medicine. You will get more details on the specific medicines your doctor prescribes. · Do not skip meals. Eating a balanced diet may increase your energy level. · Get some physical activity every day, but do not get too tired. When should you call for help? Call your doctor now or seek immediate medical care if:  · You have new or worse weakness. · You are dizzy or lightheaded, or you feel like you may faint. Watch closely for changes in your health, and be sure to contact your doctor if:  · You do not get better as expected. Where can you learn more? Go to http://tano-yefri.info/. Enter 140 [id number] [id number] in the search box to learn more about \"Weakness: Care Instructions. \"  Current as of: May 27, 2016  Content Version: 11.1  © 2875-1558 [employer]. Care instructions adapted under license by [employer] (which disclaims liability or warranty for this information). If you have questions about a medical condition or this instruction, always ask your healthcare professional. [address] any warranty or liability for your use of this information.

## 2017-01-26 NOTE — ED NOTES
Pt's bladder scanned revealed 319ml. Pt states she would like to get up to the bedside commode after she finishes eating.

## 2017-01-27 ENCOUNTER — PATIENT OUTREACH (OUTPATIENT)
Dept: CARDIOLOGY CLINIC | Age: 57
End: 2017-01-27

## 2017-01-27 NOTE — PROGRESS NOTES
This note will not be viewable in [address] Notified by in-pt HF NN for ED visit to [facility] on 1/26/17. Nicky did see and recommend 2-3 week follow up with cardiology. Note:  She is known to this NN- in the past she has not returned phone calls and has history of not attending cardiology appointments. Spoke with Ms. Yessenia Landers- she said she is still HALL when she gets up and does too much and too much standing. She has recently been discharged from [address] [place] [address] right after [place] 2016. She has been home a month now and still has [facility] therapy seeing her for home exercises. She states that she was told that she has full Medicaid and if she feels that she needs to return to [facility] she can. Right now she feels like she has help- her son is back home and she wants to be there to see her  and grandchildren. Note- there are young children crying in the background during our conversation. She says she uses [employer] for transportation- confirms that she wants to schedule follow up with Dr. Roz Banuelos in two weeks per Dr. Nohemi Vincent recommendation. She states that she no longer sees Dr. Alan Caldera for cardiology. She said she did  new medications from ED visit and is going to take her steroids and check on her glucose values- \"for now they are good\". She said she did not want to review medications right now. \" a lot going on\". She said she is weighing daily and \"they took off some fluid at the ED and she feels some better with that\". She relays that she has ongoing chronic back discomfort- \"I have screws and plates in my back\". Discussion about progression of lung and heart disease and the need to reduce episodic exacerbations especially related to added stress on her heart. The more episodes the likelihood of her heart function declining and not being able to return to baseline. She said she understands but feels that she is doing well.   Does not seem to comprehend the relationship of exacerbations and the overall affect on health and impact on recovery. I asked her to please call me if she is unable to make appointment or if she worsens or plans change.

## 2017-02-15 ENCOUNTER — TELEPHONE (OUTPATIENT)
Dept: CARDIOLOGY CLINIC | Age: 57
End: 2017-02-15

## 2017-02-15 NOTE — TELEPHONE ENCOUNTER
Spoke to ,    Patient in hospital @ [address].   will have patient call back once she gets out of the hospital to r/s

## 2017-02-16 ENCOUNTER — HOSPITAL ENCOUNTER (INPATIENT)
Age: 57
LOS: 2 days | Discharge: HOME | DRG: 140 | End: 2017-02-18
Attending: EMERGENCY MEDICINE | Admitting: FAMILY MEDICINE
Payer: MEDICAID

## 2017-02-16 ENCOUNTER — APPOINTMENT (OUTPATIENT)
Dept: GENERAL RADIOLOGY | Age: 57
DRG: 140 | End: 2017-02-16
Attending: EMERGENCY MEDICINE
Payer: MEDICAID

## 2017-02-16 DIAGNOSIS — J18.9 COMMUNITY ACQUIRED PNEUMONIA: Primary | ICD-10-CM

## 2017-02-16 LAB
ALBUMIN SERPL BCP-MCNC: 3.5 G/DL (ref 3.5–5)
ALBUMIN/GLOB SERPL: 1 {RATIO} (ref 1.1–2.2)
ALP SERPL-CCNC: 117 U/L (ref 45–117)
ALT SERPL-CCNC: 59 U/L (ref 12–78)
ANION GAP BLD CALC-SCNC: 4 MMOL/L (ref 5–15)
APTT PPP: 30.8 SEC (ref 22.1–32.5)
AST SERPL W P-5'-P-CCNC: 44 U/L (ref 15–37)
ATRIAL RATE: 90 BPM
BASOPHILS # BLD AUTO: 0 K/UL (ref 0–0.1)
BASOPHILS # BLD: 0 % (ref 0–1)
BILIRUB SERPL-MCNC: 0.2 MG/DL (ref 0.2–1)
BUN SERPL-MCNC: 30 MG/DL (ref 6–20)
BUN/CREAT SERPL: 22 (ref 12–20)
CALCIUM SERPL-MCNC: 9 MG/DL (ref 8.5–10.1)
CALCULATED P AXIS, ECG09: 46 DEGREES
CALCULATED R AXIS, ECG10: -19 DEGREES
CALCULATED T AXIS, ECG11: 112 DEGREES
CHLORIDE SERPL-SCNC: 102 MMOL/L (ref 97–108)
CO2 SERPL-SCNC: 37 MMOL/L (ref 21–32)
CREAT SERPL-MCNC: 1.34 MG/DL (ref 0.55–1.02)
DIAGNOSIS, 93000: NORMAL
EOSINOPHIL # BLD: 0 K/UL (ref 0–0.4)
EOSINOPHIL NFR BLD: 0 % (ref 0–7)
ERYTHROCYTE [DISTWIDTH] IN BLOOD BY AUTOMATED COUNT: 16 % (ref 11.5–14.5)
FLUAV AG NPH QL IA: NEGATIVE
FLUBV AG NOSE QL IA: NEGATIVE
GLOBULIN SER CALC-MCNC: 3.5 G/DL (ref 2–4)
GLUCOSE SERPL-MCNC: 144 MG/DL (ref 65–100)
HCT VFR BLD AUTO: 35.6 % (ref 35–47)
HGB BLD-MCNC: 11.5 G/DL (ref 11.5–16)
INR PPP: 2.8 (ref 0.9–1.1)
LACTATE SERPL-SCNC: 1.8 MMOL/L (ref 0.4–2)
LYMPHOCYTES # BLD AUTO: 22 % (ref 12–49)
LYMPHOCYTES # BLD: 2.5 K/UL (ref 0.8–3.5)
MCH RBC QN AUTO: 30.7 PG (ref 26–34)
MCHC RBC AUTO-ENTMCNC: 32.3 G/DL (ref 30–36.5)
MCV RBC AUTO: 94.9 FL (ref 80–99)
MONOCYTES # BLD: 1.2 K/UL (ref 0–1)
MONOCYTES NFR BLD AUTO: 11 % (ref 5–13)
MYELOCYTES NFR BLD MANUAL: 1 %
NEUTS BAND NFR BLD MANUAL: 1 % (ref 0–6)
NEUTS SEG # BLD: 7.5 K/UL (ref 1.8–8)
NEUTS SEG NFR BLD AUTO: 65 % (ref 32–75)
NRBC # BLD: 0.13 K/UL (ref 0–0.01)
NRBC BLD-RTO: 1.1 PER 100 WBC
P-R INTERVAL, ECG05: 160 MS
PLATELET # BLD AUTO: 318 K/UL (ref 150–400)
POTASSIUM SERPL-SCNC: 4.5 MMOL/L (ref 3.5–5.1)
PROT SERPL-MCNC: 7 G/DL (ref 6.4–8.2)
PROTHROMBIN TIME: 29.4 SEC (ref 9–11.1)
Q-T INTERVAL, ECG07: 326 MS
QRS DURATION, ECG06: 62 MS
QTC CALCULATION (BEZET), ECG08: 398 MS
RBC # BLD AUTO: 3.75 M/UL (ref 3.8–5.2)
RBC MORPH BLD: ABNORMAL
RBC MORPH BLD: ABNORMAL
SODIUM SERPL-SCNC: 143 MMOL/L (ref 136–145)
THERAPEUTIC RANGE,PTTT: NORMAL SECS (ref 58–77)
TROPONIN I SERPL-MCNC: 0.04 NG/ML
VENTRICULAR RATE, ECG03: 90 BPM
WBC # BLD AUTO: 11.3 K/UL (ref 3.6–11)
WBC MORPH BLD: ABNORMAL
WBC NRBC COR # BLD: ABNORMAL 10*3/UL

## 2017-02-16 PROCEDURE — 85610 PROTHROMBIN TIME: CPT | Performed by: EMERGENCY MEDICINE

## 2017-02-16 PROCEDURE — 87804 INFLUENZA ASSAY W/OPTIC: CPT | Performed by: EMERGENCY MEDICINE

## 2017-02-16 PROCEDURE — 74011250636 HC RX REV CODE- 250/636: Performed by: EMERGENCY MEDICINE

## 2017-02-16 PROCEDURE — 74011000250 HC RX REV CODE- 250: Performed by: EMERGENCY MEDICINE

## 2017-02-16 PROCEDURE — 85025 COMPLETE CBC W/AUTO DIFF WBC: CPT | Performed by: EMERGENCY MEDICINE

## 2017-02-16 PROCEDURE — 84484 ASSAY OF TROPONIN QUANT: CPT | Performed by: EMERGENCY MEDICINE

## 2017-02-16 PROCEDURE — 65270000029 HC RM PRIVATE

## 2017-02-16 PROCEDURE — 85730 THROMBOPLASTIN TIME PARTIAL: CPT | Performed by: EMERGENCY MEDICINE

## 2017-02-16 PROCEDURE — 99285 EMERGENCY DEPT VISIT HI MDM: CPT

## 2017-02-16 PROCEDURE — 74011250637 HC RX REV CODE- 250/637: Performed by: EMERGENCY MEDICINE

## 2017-02-16 PROCEDURE — 94640 AIRWAY INHALATION TREATMENT: CPT

## 2017-02-16 PROCEDURE — 83605 ASSAY OF LACTIC ACID: CPT | Performed by: EMERGENCY MEDICINE

## 2017-02-16 PROCEDURE — 80053 COMPREHEN METABOLIC PANEL: CPT | Performed by: EMERGENCY MEDICINE

## 2017-02-16 PROCEDURE — 94761 N-INVAS EAR/PLS OXIMETRY MLT: CPT

## 2017-02-16 PROCEDURE — 96366 THER/PROPH/DIAG IV INF ADDON: CPT

## 2017-02-16 PROCEDURE — 36415 COLL VENOUS BLD VENIPUNCTURE: CPT | Performed by: EMERGENCY MEDICINE

## 2017-02-16 PROCEDURE — 96365 THER/PROPH/DIAG IV INF INIT: CPT

## 2017-02-16 PROCEDURE — 93005 ELECTROCARDIOGRAM TRACING: CPT

## 2017-02-16 PROCEDURE — 77030013140 HC MSK NEB VYRM -A

## 2017-02-16 PROCEDURE — 71010 XR CHEST PORT: CPT

## 2017-02-16 RX ORDER — OXYCODONE HYDROCHLORIDE 5 MG/1
5 TABLET ORAL
Status: COMPLETED | OUTPATIENT
Start: 2017-02-16 | End: 2017-02-16

## 2017-02-16 RX ORDER — WARFARIN SODIUM 5 MG/1
5 TABLET ORAL
COMMUNITY

## 2017-02-16 RX ORDER — CLONAZEPAM 1 MG/1
1 TABLET ORAL
Status: DISCONTINUED | OUTPATIENT
Start: 2017-02-16 | End: 2017-02-18 | Stop reason: HOSPADM

## 2017-02-16 RX ORDER — LEVOFLOXACIN 5 MG/ML
750 INJECTION, SOLUTION INTRAVENOUS
Status: COMPLETED | OUTPATIENT
Start: 2017-02-16 | End: 2017-02-17

## 2017-02-16 RX ORDER — DILTIAZEM HYDROCHLORIDE 120 MG/1
240 CAPSULE, COATED, EXTENDED RELEASE ORAL DAILY
Status: DISCONTINUED | OUTPATIENT
Start: 2017-02-17 | End: 2017-02-18 | Stop reason: HOSPADM

## 2017-02-16 RX ORDER — HYDROCODONE BITARTRATE AND ACETAMINOPHEN 5; 325 MG/1; MG/1
1 TABLET ORAL
Status: DISCONTINUED | OUTPATIENT
Start: 2017-02-16 | End: 2017-02-18

## 2017-02-16 RX ORDER — INSULIN GLARGINE 100 [IU]/ML
25 INJECTION, SOLUTION SUBCUTANEOUS DAILY
Status: DISCONTINUED | OUTPATIENT
Start: 2017-02-17 | End: 2017-02-18 | Stop reason: HOSPADM

## 2017-02-16 RX ORDER — WARFARIN SODIUM 5 MG/1
2.5 TABLET ORAL
COMMUNITY

## 2017-02-16 RX ORDER — TEMAZEPAM 30 MG/1
30 CAPSULE ORAL
COMMUNITY

## 2017-02-16 RX ORDER — MONTELUKAST SODIUM 10 MG/1
10 TABLET ORAL DAILY
COMMUNITY

## 2017-02-16 RX ORDER — HYDROCODONE BITARTRATE AND ACETAMINOPHEN 5; 325 MG/1; MG/1
1 TABLET ORAL
COMMUNITY

## 2017-02-16 RX ORDER — ERGOCALCIFEROL 1.25 MG/1
50000 CAPSULE ORAL
Status: DISCONTINUED | OUTPATIENT
Start: 2017-02-20 | End: 2017-02-18 | Stop reason: HOSPADM

## 2017-02-16 RX ORDER — TRAMADOL HYDROCHLORIDE 50 MG/1
50 TABLET ORAL
COMMUNITY

## 2017-02-16 RX ORDER — IPRATROPIUM BROMIDE AND ALBUTEROL SULFATE 2.5; .5 MG/3ML; MG/3ML
3 SOLUTION RESPIRATORY (INHALATION)
Status: DISCONTINUED | OUTPATIENT
Start: 2017-02-17 | End: 2017-02-18 | Stop reason: HOSPADM

## 2017-02-16 RX ORDER — MONTELUKAST SODIUM 10 MG/1
10 TABLET ORAL DAILY
Status: DISCONTINUED | OUTPATIENT
Start: 2017-02-17 | End: 2017-02-18 | Stop reason: HOSPADM

## 2017-02-16 RX ORDER — TEMAZEPAM 15 MG/1
30 CAPSULE ORAL
Status: DISCONTINUED | OUTPATIENT
Start: 2017-02-16 | End: 2017-02-18 | Stop reason: HOSPADM

## 2017-02-16 RX ORDER — LEVOFLOXACIN 5 MG/ML
750 INJECTION, SOLUTION INTRAVENOUS EVERY 24 HOURS
Status: DISCONTINUED | OUTPATIENT
Start: 2017-02-17 | End: 2017-02-17

## 2017-02-16 RX ORDER — CLONAZEPAM 1 MG/1
1 TABLET ORAL 2 TIMES DAILY
COMMUNITY

## 2017-02-16 RX ORDER — ATORVASTATIN CALCIUM 10 MG/1
40 TABLET, FILM COATED ORAL DAILY
Status: DISCONTINUED | OUTPATIENT
Start: 2017-02-17 | End: 2017-02-18 | Stop reason: HOSPADM

## 2017-02-16 RX ORDER — PANTOPRAZOLE SODIUM 40 MG/1
40 TABLET, DELAYED RELEASE ORAL DAILY
COMMUNITY

## 2017-02-16 RX ORDER — PREDNISONE 20 MG/1
60 TABLET ORAL
Status: DISCONTINUED | OUTPATIENT
Start: 2017-02-16 | End: 2017-02-16

## 2017-02-16 RX ORDER — PANTOPRAZOLE SODIUM 40 MG/1
40 TABLET, DELAYED RELEASE ORAL
Status: DISCONTINUED | OUTPATIENT
Start: 2017-02-17 | End: 2017-02-18 | Stop reason: HOSPADM

## 2017-02-16 RX ORDER — LISINOPRIL 5 MG/1
5 TABLET ORAL DAILY
Status: DISCONTINUED | OUTPATIENT
Start: 2017-02-17 | End: 2017-02-18 | Stop reason: HOSPADM

## 2017-02-16 RX ORDER — TRAZODONE HYDROCHLORIDE 50 MG/1
50 TABLET ORAL
COMMUNITY

## 2017-02-16 RX ORDER — SODIUM CHLORIDE 0.9 % (FLUSH) 0.9 %
5-10 SYRINGE (ML) INJECTION AS NEEDED
Status: DISCONTINUED | OUTPATIENT
Start: 2017-02-16 | End: 2017-02-18 | Stop reason: HOSPADM

## 2017-02-16 RX ORDER — SODIUM CHLORIDE 0.9 % (FLUSH) 0.9 %
5-10 SYRINGE (ML) INJECTION EVERY 8 HOURS
Status: DISCONTINUED | OUTPATIENT
Start: 2017-02-16 | End: 2017-02-18 | Stop reason: HOSPADM

## 2017-02-16 RX ORDER — WARFARIN 2.5 MG/1
2.5 TABLET ORAL ONCE
Status: COMPLETED | OUTPATIENT
Start: 2017-02-16 | End: 2017-02-17

## 2017-02-16 RX ORDER — LANOLIN ALCOHOL/MO/W.PET/CERES
325 CREAM (GRAM) TOPICAL
COMMUNITY

## 2017-02-16 RX ORDER — OXYCODONE AND ACETAMINOPHEN 7.5; 325 MG/1; MG/1
1 TABLET ORAL
COMMUNITY

## 2017-02-16 RX ORDER — TRAZODONE HYDROCHLORIDE 50 MG/1
50 TABLET ORAL
Status: DISCONTINUED | OUTPATIENT
Start: 2017-02-16 | End: 2017-02-18 | Stop reason: HOSPADM

## 2017-02-16 RX ORDER — CLONIDINE HYDROCHLORIDE 0.1 MG/1
0.2 TABLET ORAL 2 TIMES DAILY
Status: DISCONTINUED | OUTPATIENT
Start: 2017-02-16 | End: 2017-02-18 | Stop reason: HOSPADM

## 2017-02-16 RX ORDER — ONDANSETRON 8 MG/1
8 TABLET, ORALLY DISINTEGRATING ORAL
Status: ON HOLD | COMMUNITY
End: 2017-02-17

## 2017-02-16 RX ORDER — LANOLIN ALCOHOL/MO/W.PET/CERES
325 CREAM (GRAM) TOPICAL
Status: DISCONTINUED | OUTPATIENT
Start: 2017-02-17 | End: 2017-02-18 | Stop reason: HOSPADM

## 2017-02-16 RX ADMIN — METHYLPREDNISOLONE SODIUM SUCCINATE 125 MG: 125 INJECTION, POWDER, FOR SOLUTION INTRAMUSCULAR; INTRAVENOUS at 19:31

## 2017-02-16 RX ADMIN — LEVOFLOXACIN 750 MG: 5 INJECTION, SOLUTION INTRAVENOUS at 20:00

## 2017-02-16 RX ADMIN — ALBUTEROL SULFATE 1 DOSE: 2.5 SOLUTION RESPIRATORY (INHALATION) at 20:35

## 2017-02-16 RX ADMIN — ALBUTEROL SULFATE 1 DOSE: 2.5 SOLUTION RESPIRATORY (INHALATION) at 18:34

## 2017-02-16 RX ADMIN — OXYCODONE HYDROCHLORIDE 5 MG: 5 TABLET ORAL at 19:31

## 2017-02-16 NOTE — IP AVS SNAPSHOT
Lani Lockettjosephine 
 
 
 [address] 
[phone] Patient: Malika Rutherford MRN: [id number] PIQ:[birth year] You are allergic to the following Allergen Reactions Aspirin Hives Shortness of Breath Swelling Throat swells up. Does not use aleve or ibuprofen because of this Pcn (Penicillins) Hives Shortness of Breath Is not sure if she has ever used [employer] Zetia (Ezetimibe) Shortness of Breath \"Throat closes up all the way\" Zithromax (Azithromycin) Hives Shortness of Breath \" [place] Patron closes up \" Zofran (Ondansetron Hcl (Pf)) Hives Itching Tolerates promethazine Recent Documentation Height Weight BMI OB Status Smoking Status 1.499 m 110 kg 48.98 kg/m2 Hysterectomy Former Smoker Emergency Contacts Name Discharge Info Relation Home Work Mobile Caesar Segura DISCHARGE CAREGIVER [3] Spouse [3] [phone] Ines Evelyn  Child [2] [phone] Chelle Rebollar  Parent [1] [phone]    
 Caesar ACUÑA  Spouse [3] [phone] About your hospitalization You were admitted on:  February 16, 2017 You last received care in the:  [facility]  MED SURG 2 You were discharged on:  February 18, 2017 Unit phone number:  [phone] Why you were hospitalized Your primary diagnosis was:  Copd Exacerbation (Hcc) Your diagnoses also included:  Obesity, Class Iii, Bmi 40-49.9 (Morbid Obesity) (Hcc), Hypertension, Hld (Hyperlipidemia), Type Ii Diabetes Mellitus With Complication, Uncontrolled (Hcc), Hypothyroidism, Gerd (Gastroesophageal Reflux Disease), Chronic Back Pain, Depression, Chronic Narcotic Dependence (Hcc), Gamal On Cpap, Atrial Fibrillation With Rvr (Hcc) Providers Seen During Your Hospitalizations Provider Role Specialty Primary office phone Sammie Barbour.  Augustina Raza MD Attending Provider Emergency Medicine [phone] Layla Abdi DO Attending Provider [facility] [phone] Your Primary Care Physician (PCP) Primary Care Physician Office Phone Office Fax Giovanni Waldron [phone] Follow-up Information Follow up With Details Comments Contact Info Shaina Haider NP Go on 2/21/2017 at 9 AM. for hospital follow up Sienna Rosenberg 11 
[phone] Current Discharge Medication List  
  
START taking these medications Dose & Instructions Dispensing Information Comments Morning Noon Evening Bedtime  
 guaiFENesin-codeine 100-10 mg/5 mL solution Commonly known as:  ROBITUSSIN AC Your next dose is: Today, Tomorrow Other:  _________ Dose:  5 mL Take 5 mL by mouth every four (4) hours as needed for Cough. Max Daily Amount: 30 mL. Indications: COUGH Quantity:  1 Bottle Refills:  0  
     
   
   
   
  
 levoFLOXacin 750 mg tablet Commonly known as:  Richelle Radon Your next dose is: Today, Tomorrow Other:  _________ Dose:  750 mg Take 1 Tab by mouth every twenty-four (24) hours for 3 days. Quantity:  3 Tab Refills:  0  
     
   
   
   
  
 predniSONE 20 mg tablet Commonly known as:  Susen Arbour Your next dose is: Today, Tomorrow Other:  _________ Taper. 60 mg (3 tabs) daily for 2 days. 40 mg (2 tabs) daily for 3 days. 20 mg (1 tab) daily for 3 days. 10 mg (1/2 tab) daily for 3 days. Quantity:  17 Tab Refills:  0 CONTINUE these medications which have CHANGED Dose & Instructions Dispensing Information Comments Morning Noon Evening Bedtime  
 furosemide 20 mg tablet Commonly known as:  LASIX What changed:   
- when to take this 
- additional instructions Your next dose is: Today, Tomorrow Other:  _________ Dose:  20 mg Take 1 Tab by mouth as needed. Only when swelling Quantity:  14 Tab Refills:  0  
     
   
   
   
  
 insulin glargine 100 unit/mL injection Commonly known as:  LANTUS What changed:  how much to take Your next dose is: Today, Tomorrow Other:  _________ Dose:  36 Units 36 Units by SubCUTAneous route daily. Quantity:  1 Vial  
Refills:  0 CONTINUE these medications which have NOT CHANGED Dose & Instructions Dispensing Information Comments Morning Noon Evening Bedtime  
 albuterol 90 mcg/actuation inhaler Commonly known as:  PROVENTIL HFA, VENTOLIN HFA, PROAIR HFA Your next dose is: Today, Tomorrow Other:  _________ Dose:  2 Puff Take 2 Puffs by inhalation four (4) times daily. Refills:  0  
     
   
   
   
  
 albuterol-ipratropium 2.5 mg-0.5 mg/3 ml Nebu Commonly known as:  Kathie Medicus Your next dose is: Today, Tomorrow Other:  _________ Dose:  3 mL  
3 mL by Nebulization route every four (4) hours as needed. Quantity:  3 mL Refills:  1  
     
   
   
   
  
 atorvastatin 40 mg tablet Commonly known as:  LIPITOR Your next dose is: Today, Tomorrow Other:  _________ Dose:  40 mg Take 40 mg by mouth daily. Refills:  0  
     
   
   
   
  
 clonazePAM 1 mg tablet Commonly known as:  Arie Pen Your next dose is: Today, Tomorrow Other:  _________ Dose:  1 mg Take 1 mg by mouth two (2) times a day. Refills:  0  
     
   
   
   
  
 cloNIDine HCl 0.2 mg tablet Commonly known as:  CATAPRES Your next dose is: Today, Tomorrow Other:  _________ Dose:  0.2 mg Take 1 Tab by mouth two (2) times a day. Quantity:  60 Tab Refills:  0  
     
   
   
   
  
 dilTIAZem  mg ER capsule Commonly known as:  CARDIZEM CD Your next dose is: Today, Tomorrow Other:  _________ Dose:  240 mg Take 1 Cap by mouth daily. Quantity:  60 Cap Refills:  0  
     
   
   
   
  
 ergocalciferol 50,000 unit capsule Commonly known as:  ERGOCALCIFEROL Your next dose is: Today, Tomorrow Other:  _________ Dose:  81607 Units Take 50,000 Units by mouth every Monday. Refills:  0  
     
   
   
   
  
 ferrous sulfate 325 mg (65 mg iron) tablet Your next dose is: Today, Tomorrow Other:  _________ Dose:  325 mg Take 325 mg by mouth Daily (before breakfast). Refills:  0  
     
   
   
   
  
 fluticasone-salmeterol 500-50 mcg/dose diskus inhaler Commonly known as:  ADVAIR Your next dose is: Today, Tomorrow Other:  _________ Dose:  1 Puff Take 1 puff by inhalation every twelve (12) hours. Quantity:  1 Inhaler Refills:  1 HumaLOG 100 unit/mL injection Generic drug:  insulin lispro Your next dose is: Today, Tomorrow Other:  _________  
   
   
 by SubCUTAneous route Before breakfast, lunch, dinner and at bedtime. Sliding scale Refills:  0 HYDROcodone-acetaminophen 5-325 mg per tablet Commonly known as:  Elroy Kit Your next dose is: Today, Tomorrow Other:  _________ Dose:  1 Tab Take 1 Tab by mouth every four (4) hours as needed for Pain. Refills:  0  
     
   
   
   
  
 levothyroxine 175 mcg tablet Commonly known as:  SYNTHROID Your next dose is: Today, Tomorrow Other:  _________ Dose:  175 mcg Take 1 Tab by mouth Daily (before breakfast). Quantity:  90 Tab Refills:  0  
     
   
   
   
  
 lisinopril 5 mg tablet Commonly known as:  Leslie November Your next dose is: Today, Tomorrow Other:  _________ Dose:  5 mg Take 1 Tab by mouth daily. Quantity:  30 Tab Refills:  1  
     
   
   
   
  
 montelukast 10 mg tablet Commonly known as:  SINGULAIR Your next dose is: Today, Tomorrow Other:  _________ Dose:  10 mg Take 10 mg by mouth daily. Refills:  0  
     
   
   
   
  
 oxyCODONE-acetaminophen 7.5-325 mg per tablet Commonly known as:  PERCOCET 7.5 Your next dose is: Today, Tomorrow Other:  _________ Dose:  1 Tab Take 1 Tab by mouth every four (4) hours as needed for Pain. Refills:  0  
     
   
   
   
  
 promethazine 25 mg tablet Commonly known as:  PHENERGAN Your next dose is: Today, Tomorrow Other:  _________ Dose:  25 mg Take 1 Tab by mouth every six (6) hours as needed. Quantity:  12 Tab Refills:  0 PROTONIX 40 mg tablet Generic drug:  pantoprazole Your next dose is: Today, Tomorrow Other:  _________ Dose:  40 mg Take 40 mg by mouth daily. Refills:  0  
     
   
   
   
  
 temazepam 30 mg capsule Commonly known as:  RESTORIL Your next dose is: Today, Tomorrow Other:  _________ Dose:  30 mg Take 30 mg by mouth nightly as needed for Sleep. Refills:  0  
     
   
   
   
  
 tiotropium 18 mcg inhalation capsule Commonly known as:  Chantelle Standing Your next dose is: Today, Tomorrow Other:  _________ Dose:  1 Cap Take 1 Cap by inhalation daily. Quantity:  30 Cap Refills:  0  
     
   
   
   
  
 traMADol 50 mg tablet Commonly known as:  ULTRAM  
   
Your next dose is: Today, Tomorrow Other:  _________ Dose:  50 mg Take 50 mg by mouth every six (6) hours as needed for Pain. Refills:  0  
     
   
   
   
  
 traZODone 50 mg tablet Commonly known as:  Symsonia Askew Your next dose is: Today, Tomorrow Other:  _________ Dose:  50 mg Take 50 mg by mouth nightly. Refills:  0  
     
   
   
   
  
 * warfarin 5 mg tablet Commonly known as:  COUMADIN Your next dose is: Today, Tomorrow Other:  _________  Dose:  2.5 mg  
 Take 2.5 mg by mouth. 5mg on Saturday and , 2.5mg Monday through Friday Refills:  0  
     
   
   
   
  
 * warfarin 5 mg tablet Commonly known as:  COUMADIN Your next dose is: Today, Tomorrow Other:  _________ Dose:  5 mg Take 5 mg by mouth. 5mg on Saturday and , 2.5mg Monday through Friday Refills:  0  
     
   
   
   
  
 * Notice: This list has 2 medication(s) that are the same as other medications prescribed for you. Read the directions carefully, and ask your doctor or other care provider to review them with you. Where to Get Your Medications Information on where to get these meds will be given to you by the nurse or doctor. ! Ask your nurse or doctor about these medications  
  guaiFENesin-codeine 100-10 mg/5 mL solution  
 levoFLOXacin 750 mg tablet  
 predniSONE 20 mg tablet Discharge Instructions HOME DISCHARGE INSTRUCTIONS Analilia Jha / [id number] : 1960 Admission date: 2017 Discharge date: 2017 Please bring this form with you to show your care provider at your follow-up appointment. Primary care provider:  Tanja Bustillos NP Discharging provider:  Jada Chavarria MD  - Family Medicine Resident Gina Desai DO - Attending, Family Medicine You have been admitted to the hospital with the following diagnoses: 
 
ACUTE DIAGNOSES: 
COPD exacerbation ([facility] [address] Ayse Lentz . . . . . . . . . . . . . . . . . . . . . . . . . . . . . . . . . . . . . . . . . . . . . . . . . . . . . . . . . . . . . . . . . . . . . . Ayse Lentz FOLLOW-UP CARE RECOMMENDATIONS: 
 
Appointments Follow-up Information Follow up With Details Comments Contact Info Tanja Bustillos NP Go on 2017 at 9 AM. for hospital follow up Sienna Rosenberg 11 
[phone] Medications:  
- Steroid Taper - Take 60 mg (3 tabs) daily for 2 days.  Then 40 mg (2 tabs) daily for 3 days. Then 20 mg (1 tab) daily for 3 days. Then 10 mg (1/2 tab) daily for 3 days. 
- Levaquin - Take 1 tab daily for 3 more days - Robitussin AC - every 4 hours AS NEEDED Follow-up tests needed: none Pending test results: At the time of your discharge the following test results are still pending: none Please make sure you review these results with your outpatient follow-up provider(s). Specific symptoms to watch for: chest pain, shortness of breath, fever, chills, nausea, vomiting, diarrhea, change in mentation, falling, weakness, bleeding. DIET/what to eat:  Diabetic Diet ACTIVITY:  Activity as tolerated Wound care: none Equipment needed:  None What to do if new or unexpected symptoms occur? If you experience any of the above symptoms (or should other concerns or questions arise after discharge) please call your primary care physician. Return to the emergency room if you cannot get hold of your doctor. · It is very important that you keep your follow-up appointment(s). · Please bring discharge papers, medication list (and/or medication bottles) to your follow-up appointments for review by your outpatient provider(s). · Please check the list of medications and be sure it includes every medication (even non-prescription medications) that your provider wants you to take. · It is important that you take the medication exactly as they are prescribed. · Keep your medication in the bottles provided by the pharmacist and keep a list of the medication names, dosages, and times to be taken in your wallet. · Do not take other medications without consulting your doctor. · If you have any questions about your medications or other instructions, please talk to your nurse or care provider before you leave the hospital.  
 
Information obtained by:  
 
I understand that if any problems occur once I am at home I am to contact my physician. These instructions were explained to me and I had the opportunity to ask questions. I understand and acknowledge receipt of the instructions indicated above. Physician's or R.N.'s Signature                                                                  Date/Time Patient or Representative Signature                                                          Date/Time Discharge Instructions Attachments/References HEALTHY WEIGHT: GENERAL INFO (ENGLISH) SLEEP APNEA (ENGLISH) Discharge Orders None [facility] Announcement We are excited to announce that we are making your provider's discharge notes available to you in miCab. You will see these notes when they are completed and signed by the physician that discharged you from your recent hospital stay. If you have any questions or concerns about any information you see in miCab, please call the Health Information Department where you were seen or reach out to your Primary Care Provider for more information about your plan of care. Introducing [facility]! Cornelia Garcia introduces miCab patient portal. Now you can access parts of your medical record, email your doctor's office, and request medication refills online. 1. In your internet browser, go to [email or web address] 2. Click on the First Time User? Click Here link in the Sign In box. You will see the New Member Sign Up page. 3. Enter your miCab Access Code exactly as it appears below. You will not need to use this code after youve completed the sign-up process. If you do not sign up before the expiration date, you must request a new code. · One On One Ads Access Code: [email or web address] Expires: 4/26/2017 12:05 PM 
 
4. Enter the last four digits of your Social Security Number (xxxx) and Date of Birth (mm/dd/yyyy) as indicated and click Submit. You will be taken to the next sign-up page. 5. Create a One On One Ads ID. This will be your One On One Ads login ID and cannot be changed, so think of one that is secure and easy to remember. 6. Create a One On One Ads password. You can change your password at any time. 7. Enter your Password Reset Question and Answer. This can be used at a later time if you forget your password. 8. Enter your e-mail address. You will receive e-mail notification when new information is available in [address] 9. Click Sign Up. You can now view and download portions of your medical record. 10. Click the Download Summary menu link to download a portable copy of your medical information. If you have questions, please visit the Frequently Asked Questions section of the One On One Ads website. Remember, One On One Ads is NOT to be used for urgent needs. For medical emergencies, dial 911. Now available from your iPhone and Android! General Information Please provide this summary of care documentation to your next provider. Patient Signature:  ____________________________________________________________ Date:  ____________________________________________________________  
  
Jane Nevaeh Provider Signature:  ____________________________________________________________ Date:  ____________________________________________________________ More Information Learning About Healthy Weight What is a healthy weight? A healthy weight is the weight at which you feel good about yourself and have energy for work and play. It's also one that lowers your risk for health problems. What can you do to stay at a healthy weight?  
It can be hard to stay at a healthy weight, especially when fast food, vending-machine snacks, and processed foods are so easy to find. And with your busy lifestyle, activity may be low on your list of things to do. But staying at a healthy weight may be easier than you think. Here are some dos and don'ts for staying at a healthy weight: 
Do eat healthy foods The kinds of foods you eat have a big impact on both your weight and your health. Reaching and staying at a healthy weight is not about going on a diet. It's about making healthier food choices every day and changing your diet for good. Healthy eating means eating a variety of foods so that you get all the nutrients you need. Your body needs protein, carbohydrate, and fats for energy. They keep your heart beating, your brain active, and your muscles working. On most days, try to eat from each food group. This means eating a variety of: · Whole grains, such as whole wheat breads and pastas. · Fruits and vegetables. · Dairy products, such as low-fat milk, yogurt, and cheese. · Lean proteins, such as all types of fish, chicken without the skin, and beans. Don't have too much or too little of one thing. All foods, if eaten in moderation, can be part of healthy eating. Even sweets can be okay. If your favorite foods are high in fat, salt, sugar, or calories, limit how often you eat them. Eat smaller servings, or look for healthy substitutes. Do watch what you eat Many people eat more than their bodies need. Part of staying at a healthy weight means learning how much food you really need from day to day and not eating more than that. Even with healthy foods, eating too much can make you gain weight. Having a well-balanced diet means that you eat enough, but not too much, and that your food gives you the nutrients you need to stay healthy. So listen to your body. Eat when you're hungry. Stop when you feel satisfied. It's a good idea to have healthy snacks ready for when you get hungry. Keep healthy snacks with you at work, in your car, and at home. If you have a healthy snack easily available, you'll be less likely to pick a candy bar or bag of chips from a vending machine instead. Some healthy snacks you might want to keep on hand are fruit, low-fat yogurt, string cheese, low-fat microwave popcorn, raisins and other dried fruit, nuts, whole wheat crackers, pretzels, carrots, celery sticks, and broccoli. Do some physical activity A big part of reaching and staying at a healthy weight is being active. When you're active, you burn calories. This makes it easier to reach and stay at a healthy weight. When you're active on a regular basis, your body burns more calories, even when you're at rest. Being active helps you lose fat and build lean muscle. Try to be active for at least 1 hour every day. This may sound like a lot, but it's okay to be active in smaller blocks of time that add up to 1 hour a day. Any activity that makes your heart beat faster and keeps it there for a while counts. A brisk walk, run, or swim will get your heart beating faster. So will climbing stairs, shooting baskets, or cycling. Even some household chores like vacuuming and mowing the lawn will get your heart rate up. Pick activities that you enjoyones that make your heart beat faster, your muscles stronger, and your muscles and joints more flexible. If you find more than one thing you like doing, do them all. You don't have to do the same thing every day. Don't diet Diets don't work. Diets are temporary. Because you give up so much when you diet, you may be hungry and think about food all the time. And after you stop dieting, you also may overeat to make up for what you missed. Most people who diet end up gaining back the pounds they lostand more. Remember that healthy bodies come in lots of shapes and sizes. Everyone can get healthier by eating better and being more active. Where can you learn more? Go to http://tano-yefri.info/. Enter 449 2290 in the search box to learn more about \"Learning About Healthy Weight. \" Current as of: February 16, 2016 Content Version: 11.1 © 6369-8653 [employer]. Care instructions adapted under license by Remember The Member (which disclaims liability or warranty for this information). If you have questions about a medical condition or this instruction, always ask your healthcare professional. [place] [address] any warranty or liability for your use of this information. Sleep Apnea: Care Instructions Your Care Instructions Sleep apnea means that you frequently stop breathing for 10 seconds or longer during sleep. It can be mild to severe, based on the number of times an hour that you stop breathing or have slowed breathing. Blocked or narrowed airways in your nose, mouth, or throat can cause sleep apnea. Your airway can become blocked when your throat muscles and tongue relax during sleep. You can treat sleep apnea at home by making lifestyle changes. You also can use a CPAP breathing machine that keeps tissues in the throat from blocking your airway. Or your doctor may suggest that you use a breathing device while you sleep. It helps keep your airway open. This could be a device that you put in your mouth. Other examples include strips or disks that you use on your nose. In some cases, surgery may be needed to remove enlarged tissues in the throat. Follow-up care is a key part of your treatment and safety. Be sure to make and go to all appointments, and call your doctor if you are having problems. It's also a good idea to know your test results and keep a list of the medicines you take. How can you care for yourself at home? · Lose weight, if needed. It may reduce the number of times you stop breathing or have slowed breathing. · Sleep on your side. It may stop mild apnea.  If you tend to roll onto your back, sew a pocket in the back of your [employer] top. Put a tennis ball into the pocket, and stitch the pocket shut. This will help keep you from sleeping on your back. · Avoid alcohol and medicines such as sleeping pills and sedatives before bed. · Do not smoke. Smoking can make sleep apnea worse. If you need help quitting, talk to your doctor about stop-smoking programs and medicines. These can increase your chances of quitting for good. · Prop up the head of your bed 4 to 6 inches by putting bricks under the legs of the bed. · Treat breathing problems, such as a stuffy nose, caused by a cold or allergies. · Try a continuous positive airway pressure (CPAP) breathing machine if your doctor recommends it. The machine keeps your airway open when you sleep. · If CPAP does not work for you, ask your doctor if you can try other breathing machines. A bilevel positive airway pressure machine uses one type of air pressure for breathing in and another type for breathing out. Another device raises or lowers air pressure as needed while you breathe. · Talk to your doctor if: 
¨ Your nose feels dry or bleeds when you use one of these machines. You may need to increase moisture in the air. A humidifier may help. ¨ Your nose is runny or stuffy from using a breathing machine. Decongestants or a corticosteroid nasal spray may help. ¨ You are sleepy during the day and it gets in the way of the normal things you do. Do not drive when you are drowsy. When should you call for help? Watch closely for changes in your health, and be sure to contact your doctor if: 
· You still have sleep apnea even though you have made lifestyle changes. · You are thinking of trying a device such as CPAP. · You are having problems using a CPAP or similar machine. Where can you learn more? Go to http://tano-yefri.info/. Enter S639 in the search box to learn more about \"Sleep Apnea: Care Instructions. \" 
 Current as of: May 23, 2016 Content Version: 11.1 © 0106-8777 [employer]. Care instructions adapted under license by [employer] (which disclaims liability or warranty for this information). If you have questions about a medical condition or this instruction, always ask your healthcare professional. [address] any warranty or liability for your use of this information.

## 2017-02-16 NOTE — ED TRIAGE NOTES
Per EMS pt is usually on 3 L via nasal cannula. Pt went out to run errands without oxygen and became SOB. Pt reports productive cough x3 nights.

## 2017-02-16 NOTE — IP AVS SNAPSHOT
Current Discharge Medication List  
  
Take these medications at their scheduled times Dose & Instructions Dispensing Information Comments Morning Noon Evening Bedtime  
 albuterol 90 mcg/actuation inhaler Commonly known as:  PROVENTIL HFA, VENTOLIN HFA, PROAIR HFA Your next dose is: Today, Tomorrow Other:  ____________ Dose:  2 Puff Take 2 Puffs by inhalation four (4) times daily. Refills:  0  
     
   
   
   
  
 atorvastatin 40 mg tablet Commonly known as:  LIPITOR Your next dose is: Today, Tomorrow Other:  ____________ Dose:  40 mg Take 40 mg by mouth daily. Refills:  0  
     
   
   
   
  
 clonazePAM 1 mg tablet Commonly known as:  Deann Kava Your next dose is: Today, Tomorrow Other:  ____________ Dose:  1 mg Take 1 mg by mouth two (2) times a day. Refills:  0  
     
   
   
   
  
 cloNIDine HCl 0.2 mg tablet Commonly known as:  CATAPRES Your next dose is: Today, Tomorrow Other:  ____________ Dose:  0.2 mg Take 1 Tab by mouth two (2) times a day. Quantity:  60 Tab Refills:  0  
     
   
   
   
  
 dilTIAZem  mg ER capsule Commonly known as:  CARDIZEM CD Your next dose is: Today, Tomorrow Other:  ____________ Dose:  240 mg Take 1 Cap by mouth daily. Quantity:  60 Cap Refills:  0  
     
   
   
   
  
 ergocalciferol 50,000 unit capsule Commonly known as:  ERGOCALCIFEROL Your next dose is: Today, Tomorrow Other:  ____________ Dose:  72368 Units Take 50,000 Units by mouth every Monday. Refills:  0  
     
   
   
   
  
 ferrous sulfate 325 mg (65 mg iron) tablet Your next dose is: Today, Tomorrow Other:  ____________ Dose:  325 mg Take 325 mg by mouth Daily (before breakfast). Refills:  0 fluticasone-salmeterol 500-50 mcg/dose diskus inhaler Commonly known as:  ADVAIR Your next dose is: Today, Tomorrow Other:  ____________ Dose:  1 Puff Take 1 puff by inhalation every twelve (12) hours. Quantity:  1 Inhaler Refills:  1 HumaLOG 100 unit/mL injection Generic drug:  insulin lispro Your next dose is: Today, Tomorrow Other:  ____________  
   
   
 by SubCUTAneous route Before breakfast, lunch, dinner and at bedtime. Sliding scale Refills:  0  
     
   
   
   
  
 insulin glargine 100 unit/mL injection Commonly known as:  LANTUS Your next dose is: Today, Tomorrow Other:  ____________ Dose:  36 Units 36 Units by SubCUTAneous route daily. Quantity:  1 Vial  
Refills:  0  
     
   
   
   
  
 levoFLOXacin 750 mg tablet Commonly known as:  Threasa Mince Your next dose is: Today, Tomorrow Other:  ____________ Dose:  750 mg Take 1 Tab by mouth every twenty-four (24) hours for 3 days. Quantity:  3 Tab Refills:  0  
     
   
   
   
  
 levothyroxine 175 mcg tablet Commonly known as:  SYNTHROID Your next dose is: Today, Tomorrow Other:  ____________ Dose:  175 mcg Take 1 Tab by mouth Daily (before breakfast). Quantity:  90 Tab Refills:  0  
     
   
   
   
  
 lisinopril 5 mg tablet Commonly known as:  Robertha Roup Your next dose is: Today, Tomorrow Other:  ____________ Dose:  5 mg Take 1 Tab by mouth daily. Quantity:  30 Tab Refills:  1  
     
   
   
   
  
 montelukast 10 mg tablet Commonly known as:  SINGULAIR Your next dose is: Today, Tomorrow Other:  ____________ Dose:  10 mg Take 10 mg by mouth daily. Refills:  0 PROTONIX 40 mg tablet Generic drug:  pantoprazole Your next dose is: Today, Tomorrow Other:  ____________ Dose:  40 mg Take 40 mg by mouth daily. Refills:  0  
     
   
   
   
  
 tiotropium 18 mcg inhalation capsule Commonly known as:  Marcheta Flaquita Your next dose is: Today, Tomorrow Other:  ____________ Dose:  1 Cap Take 1 Cap by inhalation daily. Quantity:  30 Cap Refills:  0  
     
   
   
   
  
 traZODone 50 mg tablet Commonly known as:  Lowell Fray Your next dose is: Today, Tomorrow Other:  ____________ Dose:  50 mg Take 50 mg by mouth nightly. Refills:  0 Take these medications as needed Dose & Instructions Dispensing Information Comments Morning Noon Evening Bedtime  
 albuterol-ipratropium 2.5 mg-0.5 mg/3 ml Nebu Commonly known as:  Rosita Crowley Your next dose is: Today, Tomorrow Other:  ____________ Dose:  3 mL  
3 mL by Nebulization route every four (4) hours as needed. Quantity:  3 mL Refills:  1  
     
   
   
   
  
 furosemide 20 mg tablet Commonly known as:  LASIX Your next dose is: Today, Tomorrow Other:  ____________ Dose:  20 mg Take 1 Tab by mouth as needed. Only when swelling Quantity:  14 Tab Refills:  0  
     
   
   
   
  
 guaiFENesin-codeine 100-10 mg/5 mL solution Commonly known as:  ROBITUSSIN AC Your next dose is: Today, Tomorrow Other:  ____________ Dose:  5 mL Take 5 mL by mouth every four (4) hours as needed for Cough. Max Daily Amount: 30 mL. Indications: COUGH Quantity:  1 Bottle Refills:  0 HYDROcodone-acetaminophen 5-325 mg per tablet Commonly known as:  Collette Lees Your next dose is: Today, Tomorrow Other:  ____________ Dose:  1 Tab Take 1 Tab by mouth every four (4) hours as needed for Pain. Refills:  0  
     
   
   
   
  
 oxyCODONE-acetaminophen 7.5-325 mg per tablet Commonly known as:  PERCOCET 7.5 Your next dose is: Today, Tomorrow Other:  ____________ Dose:  1 Tab Take 1 Tab by mouth every four (4) hours as needed for Pain. Refills:  0  
     
   
   
   
  
 promethazine 25 mg tablet Commonly known as:  PHENERGAN Your next dose is: Today, Tomorrow Other:  ____________ Dose:  25 mg Take 1 Tab by mouth every six (6) hours as needed. Quantity:  12 Tab Refills:  0  
     
   
   
   
  
 temazepam 30 mg capsule Commonly known as:  RESTORIL Your next dose is: Today, Tomorrow Other:  ____________ Dose:  30 mg Take 30 mg by mouth nightly as needed for Sleep. Refills:  0  
     
   
   
   
  
 traMADol 50 mg tablet Commonly known as:  ULTRAM  
   
Your next dose is: Today, Tomorrow Other:  ____________ Dose:  50 mg Take 50 mg by mouth every six (6) hours as needed for Pain. Refills:  0 Take these medications as directed Dose & Instructions Dispensing Information Comments Morning Noon Evening Bedtime  
 predniSONE 20 mg tablet Commonly known as:  Taveras Stair Your next dose is: Today, Tomorrow Other:  ____________ Taper. 60 mg (3 tabs) daily for 2 days. 40 mg (2 tabs) daily for 3 days. 20 mg (1 tab) daily for 3 days. 10 mg (1/2 tab) daily for 3 days. Quantity:  17 Tab Refills:  0  
     
   
   
   
  
 * warfarin 5 mg tablet Commonly known as:  COUMADIN Your next dose is: Today, Tomorrow Other:  ____________ Dose:  2.5 mg Take 2.5 mg by mouth. 5mg on Saturday and Sunday, 2.5mg Monday through Friday Refills:  0  
     
   
   
   
  
 * warfarin 5 mg tablet Commonly known as:  COUMADIN Your next dose is: Today, Tomorrow Other:  ____________ Dose:  5 mg Take 5 mg by mouth. 5mg on Saturday and Sunday, 2.5mg Monday through Friday Refills:  0 * Notice: This list has 2 medication(s) that are the same as other medications prescribed for you. Read the directions carefully, and ask your doctor or other care provider to review them with you. Where to Get Your Medications Information about where to get these medications is not yet available ! Ask your nurse or doctor about these medications  
  guaiFENesin-codeine 100-10 mg/5 mL solution  
 levoFLOXacin 750 mg tablet  
 predniSONE 20 mg tablet

## 2017-02-16 NOTE — ED PROVIDER NOTES
HPI Comments: 59 y.o. female with past medical history significant for COPD, CAD, chronic diastolic CHF, bronchitis, morbid obesity, HTN, asthma, arthritis, hypothyroidism, TIA, CKD, mitral stenosis, and UTI who presents via EMS with chief complaint of SOB. Earlier today, patient was \"out with her family\" and without her O2 when she began complaining of increased SOB. Patient experiences chronic SOB due to h/o COPD and is chronically on 3L O2 nasal cannula. Patient received neb treatments (x7) today, but without any relief. Patient additionally complains of fluid retention in her \"face and hands,\" mild chest pain, and \"aching in her bones all over\" -- mentions h/o chronic pain due to arthritis and scoliosis. Patient has also had productive cough for the past 3 days, and states that \"everyone at home has been sick. \"  Patient is currently on Lasix and Coumadin. There are no other acute medical concerns at this time. Social hx: former tobacco smoker; denies EtOH  PCP: Matt Tarango MD  Cardiologist: Roger Infante. MD Harry    Chart Review:  - Seen here in the ED on 1/26/17 for COPD exacerbation. Note written by Everton Beard, as dictated by Sherri Landeros. Paulette Gill MD 3:41 PM    The history is provided by the patient.         Past Medical History:   Diagnosis Date    Arthritis     Asthma     Blindness of right eye with low vision in contralateral eye     Bronchitis     CAD (coronary artery disease)      MI yrs ago per patient    Cataracts, bilateral      Injections in rt eye    Cellulitis     Chronic kidney disease      stage 1 CKD    COPD (chronic obstructive pulmonary disease) (HCC)     Diastolic CHF, chronic (HCC)     GERD (gastroesophageal reflux disease)     Hypertension     Hypothyroidism     Migraines     Mitral stenosis      severe MAC and moderate mitral stenosis    Obesity, Class III, BMI 40-49.9 (morbid obesity) ([address] 3/19/2012    On home oxygen therapy     Sleep apnea      CPAP  Spinal stenosis     TIA (transient ischemic attack) 2013     leg weakness, slurred speech.  Tobacco abuse 3/19/2012     quit    Type II or unspecified type diabetes mellitus without mention of complication, uncontrolled     UTI (urinary tract infection)        Past Surgical History:   Procedure Laterality Date    Hx orthopaedic       spinal fusion     Pr abdomen surgery proc unlisted       fibroids removed,     Hx hysterectomy      Hx colostomy      Hx endoscopy      Hx cataract removal Left     Hx cholecystectomy           Family History:   Problem Relation Age of Onset    Hypertension Mother     Asthma Mother     Diabetes Mother     Other Father 27     accident fire-rescued by dad who    [address] [facility]ick[facility] Cell An[facility] Brother 16      playing football   [address] [facility] Other Sister      e coli       Social History     Social History    Marital status:      Spouse name: N/A    Number of children: N/A    Years of education: N/A     Occupational History    Not on file. Social History Main Topics    Smoking status: Former Smoker     Packs/day: 2.00     Years: 7.00     Quit date: 2010    Smokeless tobacco: Never Used    Alcohol use No    Drug use: No    Sexual activity: Not Currently     Other Topics Concern    Not on file     Social History Narrative         ALLERGIES: Aspirin; Pcn [penicillins]; Zetia [ezetimibe]; Zithromax [azithromycin]; and Zofran [ondansetron hcl (pf)]    Review of Systems   Constitutional: Negative for chills and fever. HENT: Negative for ear pain and sore throat. Eyes: Negative for pain. Respiratory: Positive for cough and shortness of breath. Negative for chest tightness. Cardiovascular: Positive for chest pain. Gastrointestinal: Negative for abdominal pain, nausea and vomiting. Genitourinary: Negative for dysuria and flank pain. Musculoskeletal: Positive for arthralgias. Skin: Negative for rash. Neurological: Negative for headaches.    All other systems reviewed and are negative. Vitals:    02/16/17 1530   BP: 142/71   Pulse: 86   Temp: 97.8 °F (36.6 °C)   SpO2: 96%   Weight: 104.3 kg (230 lb)   Height: 4' 11\" (1.499 m)            Physical Exam   Constitutional: She appears well-developed and well-nourished. Morbidly obese. HENT:   Head: Normocephalic and atraumatic. Eyes: EOM are normal. Pupils are equal, round, and reactive to light. No scleral icterus. Neck: Neck supple. No tracheal deviation present. Cardiovascular: Normal rate and normal heart sounds. A regularly irregular rhythm present. Pulmonary/Chest: Effort normal. No respiratory distress. She has wheezes. She has rhonchi. Course breath sounds bilaterally. Abdominal: Soft. She exhibits no distension. There is no tenderness. There is no rebound and no guarding. Genitourinary:   Genitourinary Comments: deferred   Musculoskeletal: She exhibits no edema. Neurological: She is alert. Skin: Skin is warm and dry. Psychiatric: She has a normal mood and affect. Nursing note and vitals reviewed. Note written by Everton David, as dictated by Sarahi Huang. Jodene Siemens, MD 3:41 PM    [facility]  ED Course       Procedures    The patient presents with cough, SOB with a differential diagnosis of pneumonia, COPD, CHF exacerbation    ED Course:  Received solumedrol, albuterol/ atrovent with mimimal improvement          EKG Per My Interpretation:  Rhythm: normal sinus rhythm;  Rate (approx.): 90; Axis: left axis deviation; QRS interval: normal ; ST/T wave: T wave inverted; Other findings: left ventricular hypertrophy.   t wave inversions less evident in the lateral pre-cordial leads compared to Jan 2016    LABORATORY TESTS:  Recent Results (from the past 24 hour(s))   EKG, 12 LEAD, INITIAL    Collection Time: 02/16/17  3:42 PM   Result Value Ref Range    Ventricular Rate 90 BPM    Atrial Rate 90 BPM    P-R Interval 160 ms    QRS Duration 62 ms    Q-T Interval 326 ms    QTC Calculation (Bezet) 398 ms    Calculated P Axis 46 degrees    Calculated R Axis -19 degrees    Calculated T Axis 112 degrees    Diagnosis       Normal sinus rhythm  Moderate voltage criteria for LVH, may be normal variant  T wave abnormality, consider lateral ischemia  Abnormal ECG  When compared with ECG of 26-JAN-2017 11:55,  T wave inversion less evident in Lateral leads  QT has shortened     CBC WITH AUTOMATED DIFF    Collection Time: 02/16/17  6:54 PM   Result Value Ref Range    WBC 11.3 (H) 3.6 - 11.0 K/uL    RBC 3.75 (L) 3.80 - 5.20 M/uL    HGB 11.5 11.5 - 16.0 g/dL    HCT 35.6 35.0 - 47.0 %    MCV 94.9 80.0 - 99.0 FL    MCH 30.7 26.0 - 34.0 PG    MCHC 32.3 30.0 - 36.5 g/dL    RDW 16.0 (H) 11.5 - 14.5 %    PLATELET 047 193 - 964 K/uL    NEUTROPHILS PENDING %    LYMPHOCYTES PENDING %    MONOCYTES PENDING %    EOSINOPHILS PENDING %    BASOPHILS PENDING %    ABS. NEUTROPHILS PENDING K/UL    ABS. LYMPHOCYTES PENDING K/UL    ABS. MONOCYTES PENDING K/UL    ABS. EOSINOPHILS PENDING K/UL    ABS. BASOPHILS PENDING K/UL    DF PENDING    PROTHROMBIN TIME + INR    Collection Time: 02/16/17  6:54 PM   Result Value Ref Range    INR 2.8 (H) 0.9 - 1.1      Prothrombin time 29.4 (H) 9.0 - 11.1 sec   PTT    Collection Time: 02/16/17  6:54 PM   Result Value Ref Range    aPTT 30.8 22.1 - 32.5 sec    aPTT, therapeutic range     58.0 - 77.0 SECS   LACTIC ACID, PLASMA    Collection Time: 02/16/17  6:54 PM   Result Value Ref Range    Lactic acid 1.8 0.4 - 2.0 MMOL/L   NUCLEATED RBC    Collection Time: 02/16/17  6:54 PM   Result Value Ref Range    NRBC 1.1 (H) 0  WBC    ABSOLUTE NRBC 0.13 (H) 0.00 - 0.01 K/uL    WBC CORRECTED FOR NR ADJUSTED FOR NUCLEATED RBC'S         IMAGING RESULTS:  Xr Chest Port    Result Date: 2/16/2017  EXAM:  XR CHEST PORT INDICATION:  Increased SOB. Patient experiences chronic SOB due to h/o COPD and is chronically on 3L O2 nasal cannula.  Patient received neb treatments (x7) today, but without any relief. Patient additionally complains of fluid retention in her \"face and hands,\" mild chest pain, and \"aching in her bones all over\" COMPARISON:  Chest x-ray 1/26/2017. FINDINGS: A portable AP radiograph of the chest was obtained at 15:52 hours. The patient is on a cardiac monitor. There is atelectasis versus infiltrate in the left lower lung with otherwise grossly clear appearance to lungs. There is no pleural effusion or pneumothorax evident. The cardiac silhouette remains enlarged but appear stable with otherwise normal mediastinal silhouette. The chest wall structures and visualized upper abdomen appear stable with no acute interval change. IMPRESSION: Left lower lung atelectasis versus pneumonic infiltrate. MEDICATIONS GIVEN:  Medications   methylPREDNISolone (PF) (SOLU-MEDROL) injection 125 mg (not administered)   albuterol 5mg / ipratropium 0.5mg neb solution (not administered)       7:30 PM  Change of shift. Care of patient signed over to Dr. Demetra Guevara. Handoff complete.       Wyatt Hu MD

## 2017-02-17 ENCOUNTER — APPOINTMENT (OUTPATIENT)
Dept: CT IMAGING | Age: 57
DRG: 140 | End: 2017-02-17
Attending: FAMILY MEDICINE
Payer: MEDICAID

## 2017-02-17 ENCOUNTER — APPOINTMENT (OUTPATIENT)
Dept: GENERAL RADIOLOGY | Age: 57
DRG: 140 | End: 2017-02-17
Attending: FAMILY MEDICINE
Payer: MEDICAID

## 2017-02-17 LAB
ALBUMIN SERPL BCP-MCNC: 3 G/DL (ref 3.5–5)
ALBUMIN/GLOB SERPL: 0.8 {RATIO} (ref 1.1–2.2)
ALP SERPL-CCNC: 100 U/L (ref 45–117)
ALT SERPL-CCNC: 60 U/L (ref 12–78)
ANION GAP BLD CALC-SCNC: 7 MMOL/L (ref 5–15)
ANION GAP BLD CALC-SCNC: 8 MMOL/L (ref 5–15)
AST SERPL W P-5'-P-CCNC: 42 U/L (ref 15–37)
BASOPHILS # BLD AUTO: 0 K/UL (ref 0–0.1)
BASOPHILS # BLD: 0 % (ref 0–1)
BILIRUB SERPL-MCNC: 0.3 MG/DL (ref 0.2–1)
BUN SERPL-MCNC: 31 MG/DL (ref 6–20)
BUN SERPL-MCNC: 33 MG/DL (ref 6–20)
BUN/CREAT SERPL: 22 (ref 12–20)
BUN/CREAT SERPL: 28 (ref 12–20)
CALCIUM SERPL-MCNC: 8.6 MG/DL (ref 8.5–10.1)
CALCIUM SERPL-MCNC: 8.6 MG/DL (ref 8.5–10.1)
CHLORIDE SERPL-SCNC: 97 MMOL/L (ref 97–108)
CHLORIDE SERPL-SCNC: 97 MMOL/L (ref 97–108)
CO2 SERPL-SCNC: 30 MMOL/L (ref 21–32)
CO2 SERPL-SCNC: 31 MMOL/L (ref 21–32)
CREAT SERPL-MCNC: 1.19 MG/DL (ref 0.55–1.02)
CREAT SERPL-MCNC: 1.4 MG/DL (ref 0.55–1.02)
EOSINOPHIL # BLD: 0 K/UL (ref 0–0.4)
EOSINOPHIL NFR BLD: 0 % (ref 0–7)
ERYTHROCYTE [DISTWIDTH] IN BLOOD BY AUTOMATED COUNT: 15.6 % (ref 11.5–14.5)
GLOBULIN SER CALC-MCNC: 4 G/DL (ref 2–4)
GLUCOSE BLD STRIP.AUTO-MCNC: 291 MG/DL (ref 65–100)
GLUCOSE BLD STRIP.AUTO-MCNC: 355 MG/DL (ref 65–100)
GLUCOSE SERPL-MCNC: 298 MG/DL (ref 65–100)
GLUCOSE SERPL-MCNC: 414 MG/DL (ref 65–100)
HCT VFR BLD AUTO: 34.9 % (ref 35–47)
HGB BLD-MCNC: 11.2 G/DL (ref 11.5–16)
INR PPP: 2.4 (ref 0.9–1.1)
LYMPHOCYTES # BLD AUTO: 5 % (ref 12–49)
LYMPHOCYTES # BLD: 0.5 K/UL (ref 0.8–3.5)
MCH RBC QN AUTO: 30.6 PG (ref 26–34)
MCHC RBC AUTO-ENTMCNC: 32.1 G/DL (ref 30–36.5)
MCV RBC AUTO: 95.4 FL (ref 80–99)
MONOCYTES # BLD: 0.3 K/UL (ref 0–1)
MONOCYTES NFR BLD AUTO: 3 % (ref 5–13)
NEUTS SEG # BLD: 10.4 K/UL (ref 1.8–8)
NEUTS SEG NFR BLD AUTO: 92 % (ref 32–75)
PLATELET # BLD AUTO: 306 K/UL (ref 150–400)
POTASSIUM SERPL-SCNC: 5.8 MMOL/L (ref 3.5–5.1)
POTASSIUM SERPL-SCNC: 6 MMOL/L (ref 3.5–5.1)
PROT SERPL-MCNC: 7 G/DL (ref 6.4–8.2)
PROTHROMBIN TIME: 25 SEC (ref 9–11.1)
RBC # BLD AUTO: 3.66 M/UL (ref 3.8–5.2)
SERVICE CMNT-IMP: ABNORMAL
SERVICE CMNT-IMP: ABNORMAL
SODIUM SERPL-SCNC: 135 MMOL/L (ref 136–145)
SODIUM SERPL-SCNC: 135 MMOL/L (ref 136–145)
WBC # BLD AUTO: 11.3 K/UL (ref 3.6–11)

## 2017-02-17 PROCEDURE — 97163 PT EVAL HIGH COMPLEX 45 MIN: CPT

## 2017-02-17 PROCEDURE — 82962 GLUCOSE BLOOD TEST: CPT

## 2017-02-17 PROCEDURE — 74011250636 HC RX REV CODE- 250/636: Performed by: FAMILY MEDICINE

## 2017-02-17 PROCEDURE — 97116 GAIT TRAINING THERAPY: CPT

## 2017-02-17 PROCEDURE — 80048 BASIC METABOLIC PNL TOTAL CA: CPT | Performed by: FAMILY MEDICINE

## 2017-02-17 PROCEDURE — 74011250637 HC RX REV CODE- 250/637: Performed by: FAMILY MEDICINE

## 2017-02-17 PROCEDURE — 74011636637 HC RX REV CODE- 636/637: Performed by: FAMILY MEDICINE

## 2017-02-17 PROCEDURE — 36415 COLL VENOUS BLD VENIPUNCTURE: CPT | Performed by: FAMILY MEDICINE

## 2017-02-17 PROCEDURE — 74011000250 HC RX REV CODE- 250: Performed by: FAMILY MEDICINE

## 2017-02-17 PROCEDURE — 93880 EXTRACRANIAL BILAT STUDY: CPT

## 2017-02-17 PROCEDURE — 85025 COMPLETE CBC W/AUTO DIFF WBC: CPT | Performed by: FAMILY MEDICINE

## 2017-02-17 PROCEDURE — 70450 CT HEAD/BRAIN W/O DYE: CPT

## 2017-02-17 PROCEDURE — 71020 XR CHEST PA LAT: CPT

## 2017-02-17 PROCEDURE — 65270000029 HC RM PRIVATE

## 2017-02-17 PROCEDURE — 77010033678 HC OXYGEN DAILY

## 2017-02-17 PROCEDURE — 94640 AIRWAY INHALATION TREATMENT: CPT

## 2017-02-17 PROCEDURE — 85610 PROTHROMBIN TIME: CPT | Performed by: FAMILY MEDICINE

## 2017-02-17 PROCEDURE — 80053 COMPREHEN METABOLIC PANEL: CPT | Performed by: FAMILY MEDICINE

## 2017-02-17 RX ORDER — DEXTROSE 50 % IN WATER (D50W) INTRAVENOUS SYRINGE
12.5-25 AS NEEDED
Status: DISCONTINUED | OUTPATIENT
Start: 2017-02-17 | End: 2017-02-18 | Stop reason: HOSPADM

## 2017-02-17 RX ORDER — PREDNISONE 20 MG/1
60 TABLET ORAL
Status: DISCONTINUED | OUTPATIENT
Start: 2017-02-18 | End: 2017-02-18 | Stop reason: HOSPADM

## 2017-02-17 RX ORDER — INSULIN LISPRO 100 [IU]/ML
5 INJECTION, SOLUTION INTRAVENOUS; SUBCUTANEOUS ONCE
Status: COMPLETED | OUTPATIENT
Start: 2017-02-17 | End: 2017-02-17

## 2017-02-17 RX ORDER — MAGNESIUM SULFATE 100 %
4 CRYSTALS MISCELLANEOUS AS NEEDED
Status: DISCONTINUED | OUTPATIENT
Start: 2017-02-17 | End: 2017-02-18 | Stop reason: HOSPADM

## 2017-02-17 RX ORDER — WARFARIN 2 MG/1
2 TABLET ORAL EVERY EVENING
Status: COMPLETED | OUTPATIENT
Start: 2017-02-17 | End: 2017-02-17

## 2017-02-17 RX ORDER — INSULIN LISPRO 100 [IU]/ML
INJECTION, SOLUTION INTRAVENOUS; SUBCUTANEOUS
Status: DISCONTINUED | OUTPATIENT
Start: 2017-02-17 | End: 2017-02-18 | Stop reason: HOSPADM

## 2017-02-17 RX ADMIN — WARFARIN SODIUM 2.5 MG: 2.5 TABLET ORAL at 01:06

## 2017-02-17 RX ADMIN — IPRATROPIUM BROMIDE AND ALBUTEROL SULFATE 3 ML: .5; 2.5 SOLUTION RESPIRATORY (INHALATION) at 19:18

## 2017-02-17 RX ADMIN — HYDROCODONE BITARTRATE AND ACETAMINOPHEN 1 TABLET: 5; 325 TABLET ORAL at 22:56

## 2017-02-17 RX ADMIN — HYDROCODONE BITARTRATE AND ACETAMINOPHEN 1 TABLET: 5; 325 TABLET ORAL at 08:32

## 2017-02-17 RX ADMIN — GUAIFENESIN 600 MG: 600 TABLET, EXTENDED RELEASE ORAL at 15:42

## 2017-02-17 RX ADMIN — Medication 10 ML: at 01:07

## 2017-02-17 RX ADMIN — Medication 10 ML: at 05:22

## 2017-02-17 RX ADMIN — TRAZODONE HYDROCHLORIDE 50 MG: 50 TABLET ORAL at 21:24

## 2017-02-17 RX ADMIN — IPRATROPIUM BROMIDE AND ALBUTEROL SULFATE 3 ML: .5; 2.5 SOLUTION RESPIRATORY (INHALATION) at 04:30

## 2017-02-17 RX ADMIN — PANTOPRAZOLE SODIUM 40 MG: 40 TABLET, DELAYED RELEASE ORAL at 06:35

## 2017-02-17 RX ADMIN — INSULIN LISPRO 7 UNITS: 100 INJECTION, SOLUTION INTRAVENOUS; SUBCUTANEOUS at 11:30

## 2017-02-17 RX ADMIN — Medication 10 ML: at 14:00

## 2017-02-17 RX ADMIN — INSULIN LISPRO 7 UNITS: 100 INJECTION, SOLUTION INTRAVENOUS; SUBCUTANEOUS at 22:56

## 2017-02-17 RX ADMIN — CLONIDINE HYDROCHLORIDE 0.2 MG: 0.1 TABLET ORAL at 08:35

## 2017-02-17 RX ADMIN — IPRATROPIUM BROMIDE AND ALBUTEROL SULFATE 3 ML: .5; 2.5 SOLUTION RESPIRATORY (INHALATION) at 17:17

## 2017-02-17 RX ADMIN — Medication 10 ML: at 21:25

## 2017-02-17 RX ADMIN — ATORVASTATIN CALCIUM 40 MG: 10 TABLET, FILM COATED ORAL at 08:35

## 2017-02-17 RX ADMIN — HYDROCODONE BITARTRATE AND ACETAMINOPHEN 1 TABLET: 5; 325 TABLET ORAL at 05:23

## 2017-02-17 RX ADMIN — METHYLPREDNISOLONE SODIUM SUCCINATE 80 MG: 40 INJECTION, POWDER, FOR SOLUTION INTRAMUSCULAR; INTRAVENOUS at 11:46

## 2017-02-17 RX ADMIN — TRAZODONE HYDROCHLORIDE 50 MG: 50 TABLET ORAL at 01:05

## 2017-02-17 RX ADMIN — IPRATROPIUM BROMIDE AND ALBUTEROL SULFATE 3 ML: .5; 2.5 SOLUTION RESPIRATORY (INHALATION) at 01:17

## 2017-02-17 RX ADMIN — METHYLPREDNISOLONE SODIUM SUCCINATE 80 MG: 40 INJECTION, POWDER, FOR SOLUTION INTRAMUSCULAR; INTRAVENOUS at 05:22

## 2017-02-17 RX ADMIN — LEVOTHYROXINE SODIUM 175 MCG: 150 TABLET ORAL at 06:35

## 2017-02-17 RX ADMIN — HYDROCODONE BITARTRATE AND ACETAMINOPHEN 1 TABLET: 5; 325 TABLET ORAL at 18:57

## 2017-02-17 RX ADMIN — WARFARIN SODIUM 2 MG: 2 TABLET ORAL at 18:57

## 2017-02-17 RX ADMIN — METHYLPREDNISOLONE SODIUM SUCCINATE 80 MG: 40 INJECTION, POWDER, FOR SOLUTION INTRAMUSCULAR; INTRAVENOUS at 21:24

## 2017-02-17 RX ADMIN — CLONIDINE HYDROCHLORIDE 0.2 MG: 0.1 TABLET ORAL at 21:24

## 2017-02-17 RX ADMIN — Medication 325 MG: at 06:35

## 2017-02-17 RX ADMIN — HYDROCODONE BITARTRATE AND ACETAMINOPHEN 1 TABLET: 5; 325 TABLET ORAL at 01:10

## 2017-02-17 RX ADMIN — LISINOPRIL 5 MG: 5 TABLET ORAL at 08:35

## 2017-02-17 RX ADMIN — INSULIN GLARGINE 25 UNITS: 100 INJECTION, SOLUTION SUBCUTANEOUS at 08:30

## 2017-02-17 RX ADMIN — CLONIDINE HYDROCHLORIDE 0.2 MG: 0.1 TABLET ORAL at 01:05

## 2017-02-17 RX ADMIN — INSULIN LISPRO 10 UNITS: 100 INJECTION, SOLUTION INTRAVENOUS; SUBCUTANEOUS at 08:31

## 2017-02-17 RX ADMIN — INSULIN LISPRO 5 UNITS: 100 INJECTION, SOLUTION INTRAVENOUS; SUBCUTANEOUS at 08:31

## 2017-02-17 RX ADMIN — MONTELUKAST SODIUM 10 MG: 10 TABLET ORAL at 08:36

## 2017-02-17 RX ADMIN — IPRATROPIUM BROMIDE AND ALBUTEROL SULFATE 3 ML: .5; 2.5 SOLUTION RESPIRATORY (INHALATION) at 07:49

## 2017-02-17 RX ADMIN — LEVOFLOXACIN 750 MG: 250 TABLET, FILM COATED ORAL at 21:24

## 2017-02-17 RX ADMIN — DILTIAZEM HYDROCHLORIDE 240 MG: 120 CAPSULE, COATED, EXTENDED RELEASE ORAL at 08:35

## 2017-02-17 RX ADMIN — IPRATROPIUM BROMIDE AND ALBUTEROL SULFATE 3 ML: .5; 2.5 SOLUTION RESPIRATORY (INHALATION) at 23:23

## 2017-02-17 NOTE — ED NOTES
Verbal shift change report given to THANH Wylie RN (oncoming nurse) by Jun Wagner RN (offgoing nurse). Report included the following information SBAR, ED Summary and MAR.

## 2017-02-17 NOTE — ED NOTES
Assuming pt care, introduced self to pt as primary nurse. Adv pt to notify if pt did not want medical information discussed in the presence of family and visitors. Pt expressed understanding. Pt repositioned for comfort and updated on plan of care.

## 2017-02-17 NOTE — PROGRESS NOTES
02/17/17     5:27 PM    MSW talked with Howard Bhatt from [facility]. He stated that back in December, patient had a order from [facility] for a portable but must have never ordered them as she states that she does not have any. She wants a small backpack portable that can be carried. MD will need to write an or that states Titrate or smaller portable. [facility] will need to send a Respiratory Therapist to the house to evaluate for this. When patient discharges home, [facility] [address] can bring a regular portable to her at the hospital. No order needed for this. 2:30 PM    MSW received order for O2. Patient does have O2 at home through [facility] but does not have a portable. MSW requested new order from MD stating continous O2, resume HH and portable commode. Will follow up.     Shasta Ndiaye

## 2017-02-17 NOTE — ED NOTES
Attempted to call report but the bed was dirty. Bedside and Verbal shift change report given to Jossy Ambrosio RN (oncoming nurse) by Jorge Lo RN (offgoing nurse). Report included the following information SBAR, ED Summary, MAR, Recent Results, Med Rec Status and Cardiac Rhythm NSR.

## 2017-02-17 NOTE — PROGRESS NOTES
VAT Note - Upon arrival to patient's room VAT RN is notified by Meera Mishra RN that MD has decided all meds are oral and PICC line is not required. Anticipate d/c over the weekend and pt has PIV    at present.  PICC not placed

## 2017-02-17 NOTE — PROGRESS NOTES
[facility]: MED RECONCILIATION    Comments/Recommendations:  Patient medications reviewed with patient and via Rx Query to clarify doses. Patient is able to recognize medications by name and say the strength and what they are for once medication is identified. She also stated she did not have any medications today after 1400.     Medications added:     · Clonazepam 1mg twice daily  · Ferrous sulfate 325mg daily  · Montelukast 10mg daily  · Zofran 8mg as needed  · Protonix 40mg daily  · Temazepam 30mg nightly as needed  · Tramadol 50mg as needed  · Trazodone 50mg nightly    Medications removed:    · Loperamide  · Omeprazole  ·     Medications adjusted:    · None    Information obtained from:   Patient  Rx Query    Significant PMH/Disease States:   Patient Active Problem List   Diagnosis Code    Obesity, Class III, BMI 40-49.9 (morbid obesity) ([facility] E66.01    CAD (coronary artery disease) I25.10    Spinal stenosis M48.00    Hypertension I10    HLD (hyperlipidemia) E78.5    Type II diabetes mellitus with complication, uncontrolled ([facility] E11.8, E11.65    Hypothyroidism E03.9    GERD (gastroesophageal reflux disease) K21.9    Left ventricular diastolic dysfunction, grade I I51.9    Lumbar post-laminectomy syndrome M96.1    Greater trochanteric bursitis M70.60    Chronic back pain M54.9, G89.29    Radicular syndrome of right leg M54.10    Trochanteric bursitis of right hip M70.61    Diarrhea R19.7    COPD (chronic obstructive pulmonary disease) with chronic bronchitis ([facility]) J44.9    MARCO ANTONIO on CPAP G47.33    Nonhealing ulcer of right lower leg ([facility]) L97.819    Cholecystitis K81.9    S/P cholecystectomy Z90.49    COPD exacerbation ([facility]) U30.5    Diastolic CHF, acute on chronic ([facility]) I50.33    Asthma exacerbation in COPD ([facility]) J44.1, J45.901    Depression F32.9    Hyponatremia E87.1    Avascular necrosis of bone of right hip ([facility]) M87.051    Chronic narcotic dependence ([facility]) F11.20    Elevated troponin R79.89    Lower abdominal pain R10.30    Drug-seeking behavior Z76.5    Poor venous access I87.8    Atrial fibrillation with RVR ([facility]) I48.91    Chronic obstructive pulmonary disease with acute exacerbation ([facility]) J44.1    Chest pain on breathing R07.1    GELACIO (acute kidney injury) ([facility]) N17.9    Essential hypertension with goal blood pressure less than 140/90 I10    Acquired hypothyroidism E03.9    Chronic bilateral low back pain without sciatica M54.5, G89.29    Mixed hyperlipidemia E78.2     Past Medical History   Diagnosis Date    Arthritis     Asthma     Blindness of right eye with low vision in contralateral eye     Bronchitis     CAD (coronary artery disease)      MI yrs ago per patient    Cataracts, bilateral      Injections in rt eye    Cellulitis     Chronic kidney disease      stage 1 CKD    COPD (chronic obstructive pulmonary disease) ([facility])     Diastolic CHF, chronic ([facility])     GERD (gastroesophageal reflux disease)     Hypertension     Hypothyroidism     Migraines     Mitral stenosis      severe MAC and moderate mitral stenosis    Obesity, Class III, BMI 40-49.9 (morbid obesity) ([facility] 3/19/2012    On home oxygen therapy     Sleep apnea      CPAP    Spinal stenosis     TIA (transient ischemic attack) 2013     leg weakness, slurred speech.  Tobacco abuse 3/19/2012     quit    Type II or unspecified type diabetes mellitus without mention of complication, uncontrolled     UTI (urinary tract infection)      Chief Complaint for this Admission:   Chief Complaint   Patient presents with    Shortness of Breath     Allergies: Aspirin; Pcn [penicillins]; Zetia [ezetimibe]; Zithromax [azithromycin]; and Zofran [ondansetron hcl (pf)]    Prior to Admission Medications:   Prior to Admission Medications   Prescriptions Last Dose Informant Patient Reported? Taking?    HUMALOG 100 unit/mL injection 2/16/2017 at 1200 Self Yes Yes   Sig: by SubCUTAneous route Before breakfast, lunch, dinner and at bedtime. Sliding scale   HYDROcodone-acetaminophen (NORCO) 5-325 mg per tablet Unknown at Unknown time Self Yes No   Sig: Take 1 Tab by mouth every four (4) hours as needed for Pain. albuterol (PROVENTIL HFA, VENTOLIN HFA, PROAIR HFA) 90 mcg/actuation inhaler 2017 at 1200 Self Yes Yes   Sig: Take 2 Puffs by inhalation four (4) times daily. albuterol-ipratropium (DUO-NEB) 2.5 mg-0.5 mg/3 ml nebu Unknown at Unknown time Self No No   Sig: 3 mL by Nebulization route every four (4) hours as needed. atorvastatin (LIPITOR) 40 mg tablet 2/15/2017 at PM Self Yes Yes   Sig: Take 40 mg by mouth daily. cloNIDine HCl (CATAPRES) 0.2 mg tablet 2017 at AM Self No Yes   Sig: Take 1 Tab by mouth two (2) times a day. clonazePAM (KLONOPIN) 1 mg tablet 2017 at AM Self Yes Yes   Sig: Take 1 mg by mouth two (2) times a day. diltiazem CD (CARDIZEM CD) 240 mg ER capsule 2017 at AM Self No Yes   Sig: Take 1 Cap by mouth daily. ergocalciferol (ERGOCALCIFEROL) 50,000 unit capsule 2017 Self Yes No   Sig: Take 50,000 Units by mouth every Monday. ferrous sulfate 325 mg (65 mg iron) tablet 2017 at AM Self Yes Yes   Sig: Take 325 mg by mouth Daily (before breakfast). fluticasone-salmeterol (ADVAIR) 500-50 mcg/dose diskus inhaler 2017 at AM Self No Yes   Sig: Take 1 puff by inhalation every twelve (12) hours. furosemide (LASIX) 20 mg tablet 2017 at AM Self No Yes   Sig: Take 1 Tab by mouth as needed. Only when swelling   Patient taking differently: Take 20 mg by mouth daily. Uses 40mg with signs of swelling   insulin glargine (LANTUS) 100 unit/mL injection 2/15/2017 at PM Self No Yes   Si Units by SubCUTAneous route daily. Patient taking differently: 25 Units by SubCUTAneous route daily. levothyroxine (SYNTHROID) 175 mcg tablet 2017 at AM Self No Yes   Sig: Take 1 Tab by mouth Daily (before breakfast).    lisinopril (PRINIVIL, ZESTRIL) 5 mg tablet 2017 at AM Self No Yes   Sig: Take 1 Tab by mouth daily. montelukast (SINGULAIR) 10 mg tablet 2/16/2017 at AM Self Yes Yes   Sig: Take 10 mg by mouth daily. ondansetron (ZOFRAN ODT) 8 mg disintegrating tablet Unknown at Unknown time Self Yes No   Sig: Take 8 mg by mouth every eight (8) hours as needed for Nausea. oxyCODONE-acetaminophen (PERCOCET 7.5) 7.5-325 mg per tablet Unknown at Unknown time Self Yes No   Sig: Take 1 Tab by mouth every four (4) hours as needed for Pain.   pantoprazole (PROTONIX) 40 mg tablet 2/16/2017 at AM Self Yes Yes   Sig: Take 40 mg by mouth daily. promethazine (PHENERGAN) 25 mg tablet Unknown at Unknown time Self No No   Sig: Take 1 Tab by mouth every six (6) hours as needed. temazepam (RESTORIL) 30 mg capsule 2/15/2017 at Unknown time Self Yes Yes   Sig: Take 30 mg by mouth nightly as needed for Sleep.   tiotropium (SPIRIVA) 18 mcg inhalation capsule 2/16/2017 at AM Self No Yes   Sig: Take 1 Cap by inhalation daily. traMADol (ULTRAM) 50 mg tablet Unknown at Unknown time Self Yes No   Sig: Take 50 mg by mouth every six (6) hours as needed for Pain. traZODone (DESYREL) 50 mg tablet 2/15/2017 at PM Self Yes Yes   Sig: Take 50 mg by mouth nightly. warfarin (COUMADIN) 5 mg tablet 2/15/2017 at PM Self Yes Yes   Sig: Take 2.5 mg by mouth. 5mg on Saturday and Sunday, 2.5mg Monday through Friday   warfarin (COUMADIN) 5 mg tablet 2/12/2017 at PM Self Yes Yes   Sig: Take 5 mg by mouth.  5mg on Saturday and Sunday, 2.5mg Monday through Friday      Facility-Administered Medications: None     Bharath Ricci, PharmD, BCPS  Contact:

## 2017-02-17 NOTE — PROCEDURES
[address]  *** FINAL REPORT ***    Name: Brenda Gee  MRN: [id number]    Inpatient  : 1960  HIS Order #: [id number]  [address] [facility] Visit #: [id number]  Date: 2017    TYPE OF TEST: Cerebrovascular Duplex    REASON FOR TEST  Syncope    Right Carotid:-             Proximal               Mid                 Distal  cm/s  Systolic  Diastolic  Systolic  Diastolic  Systolic  Diastolic  CCA:     09.9      10.9                            72.2       8.1  Bulb:  ECA:  ICA:     60.9      11.6       41.3      10.2       47.8       8.9  ICA/CCA:  0.8       1.4    ICA Stenosis: <50%    Right Vertebral:-  Finding: Antegrade  Sys:       32.2  Donna:        7.6    Right Subclavian:    Left Carotid:-            Proximal                Mid                 Distal  cm/s  Systolic  Diastolic  Systolic  Diastolic  Systolic  Diastolic  CCA:     78.0       9.1                            76.4      13.7  Bulb:  ECA:  ICA:     50.9       6.7       38.6       7.9       48.3      14.4  ICA/CCA:  0.7       0.5    ICA Stenosis: 50-69%    Left Vertebral:-  Finding: Antegrade  Sys:       38.6  Donna:       11.1    Left Subclavian:    INTERPRETATION/FINDINGS  PROCEDURE:  Evaluation of the extracranial cerebrovascular arteries  with ultrasound (B-mode imaging, pulsed Doppler, color Doppler). Includes the common carotid, internal carotid, external carotid, and  vertebral arteries. FINDINGS: Limited study due to technical difficulty due to patient  breathing and constant movement. Heterogenous plaque identified  throughout left common carotid and  bulb. IMPRESSION: Findings are consistent with 0-49% stenosis of the right  internal carotid and 0-49%  stenosis of the left internal carotid. Vertebrals are patent with antegrade flow. ADDITIONAL COMMENTS    I have personally reviewed the data relevant to the interpretation of  this  study.     TECHNOLOGIST: Rody Marques RVT  Signed: 2017 06:14 PM    PHYSICIAN: Iggy Stubbs.  Delicia Craig MD  Signed: 02/17/2017 08:04 PM

## 2017-02-17 NOTE — CDMP QUERY
Please clarify if this patient is being treated/managed for:    =>Chronic hypoxic respiratory failure in the setting of COPD requiring continuous O2 NC 3L   =>Other Explanation of clinical findings  =>Unable to Determine (no explanation of clinical findings)    Please clarify and document your clinical opinion in the progress notes and discharge summary including the definitive and/or presumptive diagnosis, (suspected or probable), related to the above clinical findings. Please include clinical findings supporting your diagnosis.     Riaz Veras, [address] [place] [address], [address] [place] [address]  [email or web address]

## 2017-02-17 NOTE — PROGRESS NOTES
[id number] [facility] PROGRAM  PROGRESS NOTE     2/17/2017  PCP: Marquita Schilder, MD     Assessment/Plan:   Hospital Day: 2    Tammy Sandy is a 59 y.o. female who is admitted for COPD Exacerbation.     COPD with CAP: LLL effusion on CXR and bilateral wheezing on auscultation.  -Solumedrol 125mg dose given in ED, will continue with 80mg q8hr and will adjust as tolerated  -Duonebs q4hrs  -Levaquin 750mg q24 for 5 days, first dose given in ED  -O2 therapy: target PaO2 60-70mmHg or SpO2 88-94%     Hypertension; Current BP: 143/82  -Continue home Catapres and Lisinopril     DM; HgA1C 7.8 on 08/16/16.    - Humalog and Lantus 25 units qDay  - POC Glucose checks  - ISS  - Goal glucose concentration >70, <140 pre-meal and <180 with all random glucoses.     Chronic AFib; Stable  - Continue Warfarin and Cardizem     Hyperlipidemia; Lipid Panel on 4/15/16 Tchol: 176 LDL: 74 HDL: 90 Trigs: 60  -Continue Lipitor QD     Hypothyroidism: Last TSH on 6/18/16: 4.89   - Continue home dose Levothyroxine 175 mcg qd   - Follow up with PCP on discharge     Hx of Asthma - Current presentation not suspected by an asthma exacerbation  - Continue home Montelukast, and current COPD treatment     Chronic Anemia - Stable  - Continue iron supplementation     Chronic Back Pain  - Continue home Norco     Vit D Deficiency - Stable  - Continue Vit D supplementation     GERD   - Continue Protonix     Depression and Anxiety  - Trazodone, Klonopin, Restoril     Obesity - POA BMI 46.45  - Patient was counseled on the benefits and effects of a healthy lifestyle and weight loss on his recovery and in having a better health overall. Information on weight management and obesity will be provided in the discharge package.      FEN/GI - Diabetic Diet.    Activity - UP AD JOSE C  DVT prophylaxis - Warfarin, SCDs  GI prophylaxis - Protonix  Disposition - Plan to d/c to Home.      CODE STATUS:  FULL CODE    Pt to be discussed with Dr. Sparkle Mckeon (on-call attending physician)     Subjective: \"Feeling better. \"    Pt was seen and examined at bedside. Concerns overnight include: None. Denies chest pain, SOB, nausea, vomiting, abdominal pain, dizziness. Objective:   Physical examination  Visit Vitals    /82 (BP 1 Location: Right arm, BP Patient Position: At rest)    Pulse 84    Temp 98.3 °F (36.8 °C)    Resp 22    Ht 4' 11\" (1.499 m)    Wt 230 lb (104.3 kg)    LMP 1985    SpO2 96%    BMI 46.45 kg/m2      Temp (24hrs), Av.1 °F (36.7 °C), Min:97.8 °F (36.6 °C), Max:98.3 °F (36.8 °C)     O2 Flow Rate (L/min): 2 l/min   O2 Device: Nasal cannula    Physical Exam    General: No acute distress. Alert. Cooperative. Obese   Head: Normocephalic. Atraumatic. Neck: Supple. Normal ROM. No stiffness. Respiratory: Bilateral wheezing   Cardiovascular: RRR. Normal S1,S2. No m/r/g. Pulses 2+ throughout. GI: + bowel sounds. Nontender. No rebound tenderness or guarding. Nondistended. Extremities: No edema. No palpable cord. No tenderness. Musculoskeletal: Full ROM in all extremities. Neuro: CN II-XII grossly intact. Strength 5/5 in all extremities. Sensation intact in all extremities. DTRs 2+ throughout. Skin: Clear. No rashes. No ulcers.           Data Review:     Recent Labs      17   1854   WBC  11.3*   HGB  11.5   HCT  35.6   PLT  318     Recent Labs      17   1854   NA  143   K  4.5   CL  102   CO2  37*   GLU  144*   BUN  30*   CREA  1.34*   CA  9.0   ALB  3.5   TBILI  0.2   SGOT  44*   ALT  59   INR  2.8*     Medications reviewed  Current Facility-Administered Medications   Medication Dose Route Frequency    methylPREDNISolone (PF) (SOLU-MEDROL) injection 80 mg  80 mg IntraVENous Q8H    insulin lispro (HUMALOG) injection   SubCUTAneous AC&HS    glucose chewable tablet 16 g  4 Tab Oral PRN    dextrose (D50W) injection syrg 12.5-25 g  12.5-25 g IntraVENous PRN    glucagon (GLUCAGEN) injection 1 mg  1 mg IntraMUSCular PRN    sodium chloride (NS) flush 5-10 mL  5-10 mL IntraVENous Q8H    sodium chloride (NS) flush 5-10 mL  5-10 mL IntraVENous PRN    sodium chloride (NS) flush 5-10 mL  5-10 mL IntraVENous Q8H    sodium chloride (NS) flush 5-10 mL  5-10 mL IntraVENous PRN    atorvastatin (LIPITOR) tablet 40 mg  40 mg Oral DAILY    clonazePAM (KlonoPIN) tablet 1 mg  1 mg Oral BID PRN    cloNIDine HCl (CATAPRES) tablet 0.2 mg  0.2 mg Oral BID    dilTIAZem CD (CARDIZEM CD) capsule 240 mg  240 mg Oral DAILY    [START ON 2/20/2017] ergocalciferol (ERGOCALCIFEROL) capsule 50,000 Units  50,000 Units Oral every Monday    ferrous sulfate tablet 325 mg  325 mg Oral ACB    HYDROcodone-acetaminophen (NORCO) 5-325 mg per tablet 1 Tab  1 Tab Oral Q4H PRN    insulin glargine (LANTUS) injection 25 Units  25 Units SubCUTAneous DAILY    levothyroxine (SYNTHROID) tablet 175 mcg  175 mcg Oral ACB    lisinopril (PRINIVIL, ZESTRIL) tablet 5 mg  5 mg Oral DAILY    montelukast (SINGULAIR) tablet 10 mg  10 mg Oral DAILY    pantoprazole (PROTONIX) tablet 40 mg  40 mg Oral ACB    temazepam (RESTORIL) capsule 30 mg  30 mg Oral QHS PRN    traZODone (DESYREL) tablet 50 mg  50 mg Oral QHS    levoFLOXacin (LEVAQUIN) 750 mg in D5W IVPB  750 mg IntraVENous Q24H    albuterol-ipratropium (DUO-NEB) 2.5 MG-0.5 MG/3 ML  3 mL Nebulization Q4H RT    Warfarin: pharmacy dosing daily   Other PRN         Signed:   Christen Appiah MD   Resident, [facility] Problems  Date Reviewed: 8/5/2016          Codes Class Noted POA    Atrial fibrillation with RVR ([facility] ICD-10-CM: I48.91  ICD-9-CM: 427.31  6/18/2016 Yes        Drug-seeking behavior (Chronic) ICD-10-CM: Z76.5  ICD-9-CM: 305.90 Chronic 5/10/2016 Yes    Overview Addendum 6/28/2016  4:38 PM by Charo Vieyra MD     [place]  shows inappropriate behavior.       Dena   Female, 60 y.o., 1960  Last Weight:  285 lb 1.6 oz (129.321 kg)  Phone:  [phone]  PCP: Erik Whitehead  Language:  [place]  Need Interp:  No  AllergiesAspirin  Pcn [Penicillins]  Zetia [Ezetimibe]  Zithromax [Azithromycin]  Zofran [Ondansetron Hcl (Pf)]  Health Maintenance:  Due  FYIGeneral  Nov 2012 - Flowsheet Error  Primary Ins:  SHAG[employer]  MRN:  [id number]  MyChart:  Pending  Next Appt:  07/05/2016    Documentation (patient rec'd opiate scripts from 4 or more providers)        Natalia Morrison MD (You)   1 hour ago   (2:57 PM)      Forwarding message of 6/23 to physician. Ya Machado   5 days ago   (10:39 AM)      Letter rec'd of [facility], Dept of [place], Drug Utilization Review Program., that patient has received opiate scripts from four or more prescribers. Will be at  for review.                      Chronic narcotic dependence ([facility] (Chronic) ICD-10-CM: F11.20  ICD-9-CM: 304.91  4/15/2016 Yes        Depression (Chronic) ICD-10-CM: F32.9  ICD-9-CM: 726  10/28/2015 Yes        * (Principal)COPD exacerbation ([facility] ICD-10-CM: J44.1  ICD-9-CM: 491.21  10/27/2015 Yes        MARCO ANTONIO on CPAP ICD-10-CM: G47.33  ICD-9-CM: 327.23  5/6/2015 Yes    Overview Signed 5/6/2015 11:03 AM by Arlet Srivastava MD     4 cm H2O per patient             Chronic back pain (Chronic) ICD-10-CM: M54.9, G89.29  ICD-9-CM: 724.5, 338.29  10/27/2014 Yes    Overview Signed 11/3/2015 11:25 AM by Arlet Srivastava MD      11/2015:  Numerous pain pills including narcotics form several different MDs over the past year. We discussed this 11/3/2015. She will get all of her pain mediicnes from Dr. Heydi Virk PMR from now on. i warned her not to get pain pills from others even if offered. Hypothyroidism (Chronic) ICD-10-CM: E03.9  ICD-9-CM: 244.9  4/18/2014 Yes    Overview Signed 11/10/2014  3:42 PM by Arlet Srivastava MD     TSH > 100 11/2014. Synthroid adjusted to 100 mcg daily. Recheck in 3 months. Compliance?               GERD (gastroesophageal reflux disease) (Chronic) ICD-10-CM: K21.9  ICD-9-CM: 530.81  4/18/2014 Yes        Type II diabetes mellitus with complication, uncontrolled (HCC) (Chronic) ICD-10-CM: E11.8, E11.65  ICD-9-CM: 250.92  4/7/2014 Yes        HLD (hyperlipidemia) (Chronic) ICD-10-CM: M38.6  ICD-9-CM: 272.4  12/11/2013 Yes        Hypertension (Chronic) ICD-10-CM: I10  ICD-9-CM: 401.9  Unknown Yes        Obesity, Class III, BMI 40-49.9 (morbid obesity) ([facility] (Chronic) ICD-10-CM: E66.01  ICD-9-CM: 278.01  3/19/2012 Yes

## 2017-02-17 NOTE — PROGRESS NOTES
Occupational therapy note:  Orders received,chart reviewed. Attempted to see patient for OT evaluation. Spoke with nursing who reports patient leaving floor for to ultrasound and CT. Will follow up with patient for OT evaluation as able. Ni Guan MS OTR/L

## 2017-02-17 NOTE — PROGRESS NOTES
Limited carotid duplex completed. Final results to follow. Technically difficult test due to patient breathing and constant movement.

## 2017-02-17 NOTE — PROGRESS NOTES
[facility] Dosing Services: 2/16/17    Consult for Warfarin Dosing by Pharmacy by Dr. Sagar Valera provided for this 59 y.o.  female , for indication of Atrial Fibrillation. Day of Therapy: home medication  Dose to achieve an INR goal of 2-3    Order entered for  Warfarin  2.5 (mg) ordered to be given today at 2345. Significant drug interactions: Levaquin  Previous dose given 2.5 mg on 2/15/17. Home regimen in 2.5 mg on Mon,Tues,Wed,Thur,Fri and 5 mg on Sat and Sun   PT/INR Lab Results   Component Value Date/Time    INR 2.8 02/16/2017 06:54 PM    INR (POC) 1.0 08/16/2016 03:50 PM      Platelets Lab Results   Component Value Date/Time    PLATELET 068 89/15/4626 06:54 PM      H/H Lab Results   Component Value Date/Time    HGB 11.5 02/16/2017 06:54 PM        Pharmacy to follow daily and will provide subsequent Warfarin dosing based on clinical status.   Kajal Bull)  Contact information [phone]

## 2017-02-17 NOTE — PROGRESS NOTES
2/16/2017  CARE MANAGEMENT NOTE: (late entry) CM is following pt for initial discharge planning. Pt is familiar to me from multiple hospital admissions in in the past. Her last hospital admit was 8/16/16. She has had ER visits to [facility] and [facility] recently. Pt continues to live with her  in [place], [place]. There are about four entry steps. PT is ambulatory with a rolling walker, and she reports being indepn with ADLs. Pt does not drive. She has had several home healthcare agencies in the past and currently has [facility] agency for PT. This agency also monitors pt's Trilogy device. DME in the home consists of a cane, RW, nebulizer, Cpap, Trilogy, and oxgyen thru [facility]. PCP is Dr. Ender Ramon. The PCP office is within walking distance from her home. Plan is to return home when medically stable.   Nicky

## 2017-02-17 NOTE — INTERDISCIPLINARY ROUNDS
Interdisciplinary team rounds were held 2/17/2017 with the following team members:Care Management, Nursing, Physical Therapy, Physician, Clinical Coordinator and Unit Nurse Manager. .    Plan of care discussed. See clinical pathway and/or care plan for interventions and desired outcomes. Anticipated discharge: possibly over the weekend.

## 2017-02-17 NOTE — H&P
[facility]   Admission H&P    Date of admission: 2/16/2017    Patient name: Lynne Pillai  MRN: [id number]  YOB: 1960  Age: 59 y.o. Primary care provider:  Ian Gallo MD     Source of Information: patient, medical records    Chief complaint:  SOB    History of Present Illness  Lynne Pillai is a 59 y.o. female Hx of COPD, CAD, chronic diastolic CHF, morbid obesity, HTN, asthma, hypothyroidism, CKD who presents to the ER complaining of SOB. In the afternoon, the patient started to have SOB for which she had 7 treatments with Nebulizer at home without any improvement. She also reports to have an epiosode of LOC, that she woke up on the ambulance on her way to the ED. She reports to have a dry cough for a few days that gave her some chest pain with every coughing spell. She is chronically on NC at 3 L/min at home. Denies any other medical concerns at this moment. In the ER, vital signs were remarkable for /71, SatO2 98% on NC at 3 L/min. Labs were remarkable for WBC 11.3, Trop 0.04. CXR showed Left lower lung atelectasis versus pneumonic infiltrate. Pt was treated with DuoNebs, Solumedrol 125 mg, Levaquin. Home Medications   Prior to Admission medications    Medication Sig Start Date End Date Taking? Authorizing Provider   ferrous sulfate 325 mg (65 mg iron) tablet Take 325 mg by mouth Daily (before breakfast). Yes Historical Provider   montelukast (SINGULAIR) 10 mg tablet Take 10 mg by mouth daily. Yes Historical Provider   pantoprazole (PROTONIX) 40 mg tablet Take 40 mg by mouth daily. Yes Historical Provider   clonazePAM (KLONOPIN) 1 mg tablet Take 1 mg by mouth two (2) times a day. Yes Historical Provider   temazepam (RESTORIL) 30 mg capsule Take 30 mg by mouth nightly as needed for Sleep. Yes Historical Provider   traZODone (DESYREL) 50 mg tablet Take 50 mg by mouth nightly.    Yes Historical Provider   warfarin (COUMADIN) 5 mg tablet Take 2.5 mg by mouth. 5mg on Saturday and Sunday, 2.5mg Monday through Friday   Yes Historical Provider   warfarin (COUMADIN) 5 mg tablet Take 5 mg by mouth. 5mg on Saturday and Sunday, 2.5mg Monday through Friday   Yes Historical Provider   cloNIDine HCl (CATAPRES) 0.2 mg tablet Take 1 Tab by mouth two (2) times a day. 10/14/16  Yes Branden Burrows MD   insulin glargine (LANTUS) 100 unit/mL injection 36 Units by SubCUTAneous route daily. Patient taking differently: 25 Units by SubCUTAneous route daily. 8/18/16  Yes Neto Mukherjee DO   furosemide (LASIX) 20 mg tablet Take 1 Tab by mouth as needed. Only when swelling  Patient taking differently: Take 20 mg by mouth daily. Uses 40mg with signs of swelling 8/18/16  Yes Neto Mukherjee DO   diltiazem CD (CARDIZEM CD) 240 mg ER capsule Take 1 Cap by mouth daily. 6/24/16  Yes Emil Rodriguez MD   albuterol (PROVENTIL HFA, VENTOLIN HFA, PROAIR HFA) 90 mcg/actuation inhaler Take 2 Puffs by inhalation four (4) times daily. Yes Historical Provider   atorvastatin (LIPITOR) 40 mg tablet Take 40 mg by mouth daily. Yes Historical Provider   levothyroxine (SYNTHROID) 175 mcg tablet Take 1 Tab by mouth Daily (before breakfast). 5/17/16  Yes Renetta Cain MD   lisinopril (PRINIVIL, ZESTRIL) 5 mg tablet Take 1 Tab by mouth daily. 4/15/16  Yes Crow Frost MD   HUMALOG 100 unit/mL injection by SubCUTAneous route Before breakfast, lunch, dinner and at bedtime. Sliding scale 3/20/15  Yes Blaine Waggoner MD   tiotropium (SPIRIVA) 18 mcg inhalation capsule Take 1 Cap by inhalation daily. 3/9/15  Yes Trevor Guadarrama MD   fluticasone-salmeterol (ADVAIR) 500-50 mcg/dose diskus inhaler Take 1 puff by inhalation every twelve (12) hours. 8/30/14  Yes Perico Melgar MD   HYDROcodone-acetaminophen [facility]) 5-325 mg per tablet Take 1 Tab by mouth every four (4) hours as needed for Pain.     Historical Provider   oxyCODONE-acetaminophen (PERCOCET 7.5) 7.5-325 mg per tablet Take 1 Tab by mouth every four (4) hours as needed for Pain. Historical Provider   ondansetron (ZOFRAN ODT) 8 mg disintegrating tablet Take 8 mg by mouth every eight (8) hours as needed for Nausea. Historical Provider   traMADol (ULTRAM) 50 mg tablet Take 50 mg by mouth every six (6) hours as needed for Pain. Historical Provider   albuterol-ipratropium (DUO-NEB) 2.5 mg-0.5 mg/3 ml nebu 3 mL by Nebulization route every four (4) hours as needed. 7/27/16   Rochelle Tovar MD   promethazine (PHENERGAN) 25 mg tablet Take 1 Tab by mouth every six (6) hours as needed. 7/17/16   DOM Barajas   ergocalciferol (ERGOCALCIFEROL) 50,000 unit capsule Take 50,000 Units by mouth every Monday. Historical Provider       Allergies   Allergies   Allergen Reactions    Aspirin Hives, Shortness of Breath and Swelling     Throat swells up.  Does not use aleve or ibuprofen because of this    Pcn [Penicillins] Hives and Shortness of Breath     Is not sure if she has ever used Cephs    Zetia [Ezetimibe] Shortness of Breath     \"Throat closes up all the way\"    Zithromax [Azithromycin] Hives and Shortness of Breath     \" [place] closes up \"    Zofran [Ondansetron Hcl (Pf)] Hives and Itching     Tolerates promethazine       Past Medical History   Diagnosis Date    Arthritis     Asthma     Blindness of right eye with low vision in contralateral eye     Bronchitis     CAD (coronary artery disease)      MI yrs ago per patient    Cataracts, bilateral      Injections in rt eye    Cellulitis     Chronic kidney disease      stage 1 CKD    COPD (chronic obstructive pulmonary disease) (HCC)     Diastolic CHF, chronic (HCC)     GERD (gastroesophageal reflux disease)     Hypertension     Hypothyroidism     Migraines     Mitral stenosis      severe MAC and moderate mitral stenosis    Obesity, Class III, BMI 40-49.9 (morbid obesity) ([facility] [address] 3/19/2012    On home oxygen therapy     Sleep apnea      CPAP    Spinal stenosis     TIA (transient ischemic attack)      leg weakness, slurred speech.  Tobacco abuse 3/19/2012     quit    Type II or unspecified type diabetes mellitus without mention of complication, uncontrolled     UTI (urinary tract infection)        Past Surgical History   Procedure Laterality Date    Hx orthopaedic       spinal fusion     Pr abdomen surgery proc unlisted       fibroids removed,     Hx hysterectomy      Hx colostomy      Hx endoscopy      Hx cataract removal Left     Hx cholecystectomy         Family History   Problem Relation Age of Onset    Hypertension Mother     Asthma Mother     Diabetes Mother     Other Father 27     accident fire-rescued by dad who    [place] Sickle Cell Anemia Brother 16      playing football    Other Sister      e coli       Social History   Patient resides  x  Independently      With family care      Assisted living      SNF      Ambulates  x  Independently      With cane       Assisted walker           Alcohol history   x  None     Social     Chronic     Smoking history    None   x  Former smoker (2 PPD for 7 years, quit in )     Current smoker     History   Smoking Status    Former Smoker    Packs/day: 2.00    Years: 7.00    Quit date: 2010   Smokeless Tobacco    Never Used       Drug history  x  None     Former drug user     Current drug user     Sexual history    Sexually active    x  Not sexually active     Code status  x  Full code     DNR/DNI     Partial    Code status discussed with the patient/caregivers. Review of Systems (negative unless on bold)    Constitutional: Negative for chills, fever and weight loss. HENT: Negative for sore throat. Eyes: Negative for blurred vision and double vision. Respiratory: Negative for cough, SOB, hemoptysis and wheezing. Cardiovascular: Negative for chest pain, orthopnea and leg swelling.    Gastrointestinal: Negative for abdominal pain, diarrhea, heartburn, nausea and vomiting. Genitourinary: Negative for dysuria, frequency and urgency. Musculoskeletal: Positive for joint pain. Negative for myalgias. Skin: Negative for itching and rash. Neurological: Negative for dizziness, seizures and headaches. Endo/Heme/Allergies: Positive for environmental allergies. Negative for polydipsia. Does not bruise/bleed easily. Psychiatric/Behavioral: Negative for depression and suicidal ideas. Physical Exam  Visit Vitals    /82    Pulse 84    Temp 97.8 °F (36.6 °C)    Resp 16    Ht 4' 11\" (1.499 m)    Wt 230 lb (104.3 kg)    LMP 09/21/1985    SpO2 97%    BMI 46.45 kg/m2        General: No acute distress. Alert. Cooperative. Obese   Head: Normocephalic. Atraumatic. Eyes:  Conjunctiva pink. Sclera white. PERRL. Ears:  Ear canals patent. TM non-erythematous. Nose:  Septum midline. Mucosa pink. No drainage. Throat: Mucosa pink. Moist mucous membranes. No tonsillar exudates or erythema. Palate movement equal bilaterally. Neck: Supple. Normal ROM. No stiffness. Respiratory: Bilateral wheezing   Cardiovascular: RRR. Normal S1,S2. No m/r/g. Pulses 2+ throughout. GI: + bowel sounds. Nontender. No rebound tenderness or guarding. Nondistended. Extremities: No edema. No palpable cord. No tenderness. Musculoskeletal: Full ROM in all extremities. Neuro: CN II-XII grossly intact. Strength 5/5 in all extremities. Sensation intact in all extremities. DTRs 2+ throughout. Skin: Clear. No rashes. No ulcers.     : Deferred   Rectal: Deferred       Laboratory Data  Recent Results (from the past 24 hour(s))   EKG, 12 LEAD, INITIAL    Collection Time: 02/16/17  3:42 PM   Result Value Ref Range    Ventricular Rate 90 BPM    Atrial Rate 90 BPM    P-R Interval 160 ms    QRS Duration 62 ms    Q-T Interval 326 ms    QTC Calculation (Bezet) 398 ms    Calculated P Axis 46 degrees    Calculated R Axis -19 degrees    Calculated T Axis 112 degrees    Diagnosis       Normal sinus rhythm  Moderate voltage criteria for LVH, may be normal variant  T wave abnormality, consider lateral ischemia  Abnormal ECG  When compared with ECG of 26-JAN-2017 11:55,  No significant change    Confirmed by Neymar Mcdonald MD ([id number]) on 2/16/2017 20:36:02 PM     METABOLIC PANEL, COMPREHENSIVE    Collection Time: 02/16/17  6:54 PM   Result Value Ref Range    Sodium 143 136 - 145 mmol/L    Potassium 4.5 3.5 - 5.1 mmol/L    Chloride 102 97 - 108 mmol/L    CO2 37 (H) 21 - 32 mmol/L    Anion gap 4 (L) 5 - 15 mmol/L    Glucose 144 (H) 65 - 100 mg/dL    BUN 30 (H) 6 - 20 MG/DL    Creatinine 1.34 (H) 0.55 - 1.02 MG/DL    BUN/Creatinine ratio 22 (H) 12 - 20      GFR est AA 49 (L) >60 ml/min/1.73m2    GFR est non-AA 40 (L) >60 ml/min/1.73m2    Calcium 9.0 8.5 - 10.1 MG/DL    Bilirubin, total 0.2 0.2 - 1.0 MG/DL    ALT (SGPT) 59 12 - 78 U/L    AST (SGOT) 44 (H) 15 - 37 U/L    Alk. phosphatase 117 45 - 117 U/L    Protein, total 7.0 6.4 - 8.2 g/dL    Albumin 3.5 3.5 - 5.0 g/dL    Globulin 3.5 2.0 - 4.0 g/dL    A-G Ratio 1.0 (L) 1.1 - 2.2     CBC WITH AUTOMATED DIFF    Collection Time: 02/16/17  6:54 PM   Result Value Ref Range    WBC 11.3 (H) 3.6 - 11.0 K/uL    RBC 3.75 (L) 3.80 - 5.20 M/uL    HGB 11.5 11.5 - 16.0 g/dL    HCT 35.6 35.0 - 47.0 %    MCV 94.9 80.0 - 99.0 FL    MCH 30.7 26.0 - 34.0 PG    MCHC 32.3 30.0 - 36.5 g/dL    RDW 16.0 (H) 11.5 - 14.5 %    PLATELET 879 036 - 414 K/uL    NEUTROPHILS 65 32 - 75 %    BAND NEUTROPHILS 1 0 - 6 %    LYMPHOCYTES 22 12 - 49 %    MONOCYTES 11 5 - 13 %    EOSINOPHILS 0 0 - 7 %    BASOPHILS 0 0 - 1 %    MYELOCYTES 1 (H) 0 %    ABS. NEUTROPHILS 7.5 1.8 - 8.0 K/UL    ABS. LYMPHOCYTES 2.5 0.8 - 3.5 K/UL    ABS. MONOCYTES 1.2 (H) 0.0 - 1.0 K/UL    ABS. EOSINOPHILS 0.0 0.0 - 0.4 K/UL    ABS.  BASOPHILS 0.0 0.0 - 0.1 K/UL    RBC COMMENTS ANISOCYTOSIS  1+        RBC COMMENTS POIKILOCYTOSIS  PRESENT        WBC COMMENTS REACTIVE LYMPHS     PROTHROMBIN TIME + INR    Collection Time: 02/16/17  6:54 PM   Result Value Ref Range    INR 2.8 (H) 0.9 - 1.1      Prothrombin time 29.4 (H) 9.0 - 11.1 sec   PTT    Collection Time: 02/16/17  6:54 PM   Result Value Ref Range    aPTT 30.8 22.1 - 32.5 sec    aPTT, therapeutic range     58.0 - 77.0 SECS   TROPONIN I    Collection Time: 02/16/17  6:54 PM   Result Value Ref Range    Troponin-I, Qt. 0.04 <0.05 ng/mL   LACTIC ACID, PLASMA    Collection Time: 02/16/17  6:54 PM   Result Value Ref Range    Lactic acid 1.8 0.4 - 2.0 MMOL/L   INFLUENZA A & B AG (RAPID TEST)    Collection Time: 02/16/17  6:54 PM   Result Value Ref Range    Influenza A Antigen NEGATIVE  NEG      Influenza B Antigen NEGATIVE  NEG     NUCLEATED RBC    Collection Time: 02/16/17  6:54 PM   Result Value Ref Range    NRBC 1.1 (H) 0  WBC    ABSOLUTE NRBC 0.13 (H) 0.00 - 0.01 K/uL    WBC CORRECTED FOR NR ADJUSTED FOR NUCLEATED RBC'S       Imaging  EXAM: XR CHEST PORT     INDICATION: Increased SOB. Patient experiences chronic SOB due to h/o COPD and  is chronically on 3L O2 nasal cannula. Patient received neb treatments (x7)  today, but without any relief. Patient additionally complains of fluid retention  in her \"face and hands,\" mild chest pain, and \"aching in her bones all over\"     COMPARISON: Chest x-ray 1/26/2017.     FINDINGS: A portable AP radiograph of the chest was obtained at 15:52 hours. The  patient is on a cardiac monitor. There is atelectasis versus infiltrate in the  left lower lung with otherwise grossly clear appearance to lungs. There is no  pleural effusion or pneumothorax evident. The cardiac silhouette remains  enlarged but appear stable with otherwise normal mediastinal silhouette. The  chest wall structures and visualized upper abdomen appear stable with no acute  interval change.      IMPRESSION  IMPRESSION: Left lower lung atelectasis versus pneumonic infiltrate.       EKG:  Normal sinus rhythm    Moderate voltage criteria for LVH, may be normal variant T wave abnormality, consider lateral ischemia    Abnormal ECG    When compared with ECG of 26-JAN-2017 11:55,    No significant change     Assessment and Plan   Marcella Menendez is a 59 y.o. female who is admitted for COPD Exacerbation. COPD with CAP: LLL effusion on CXR and bilateral wheezing on auscultation.  -Solumedrol 125mg dose given in ED, will continue with 80mg q8hr and will adjust as tolerated  -Duonebs q4hrs  -Levaquin 750mg q24 for 5 days, first dose given in ED  -O2 therapy: target PaO2 60-70mmHg or SpO2 88-94%    Hypertension; Current BP: 143/82  -Continue home Catapres and Lisinopril    DM; HgA1C  7.8 on 08/16/16.    - Humalog and Lantus 25 units qDay  - POC Glucose checks  - ISS  - Goal glucose concentration >70, <140 pre-meal and <180 with all random glucoses. Chronic AFib; Stable  - Continue Warfarin and Cardizem    Hyperlipidemia; Lipid Panel on 4/15/16 Tchol: 176 LDL: 74 HDL: 90 Trigs: 60  -Continue Lipitor QD    Hypothyroidism: Last TSH on 6/18/16: 4.89   - Continue home dose Levothyroxine 175 mcg qd   - Follow up with PCP on discharge    Hx of Asthma - Current presentation not suspected by an asthma exacerbation  - Continue home Montelukast, and current COPD treatment    Chronic Anemia - Stable  - Continue iron supplementation    Chronic Back Pain  - Continue home Norco    Vit D Deficiency - Stable  - Continue Vit D supplementation    GERD   - Continue Protonix    Depression and Anxiety  - Trazodone, Klonopin, Restoril    Obesity - POA  BMI 46.45  - Patient was counseled on the benefits and effects of a healthy lifestyle and weight loss on his recovery and in having a better health overall. Information on weight management and obesity will be provided in the discharge package. FEN/GI - Diabetic Diet. Activity - UP AD JOSE C  DVT prophylaxis - Warfarin, SCDs  GI prophylaxis -  Protonix  Disposition - Plan to d/c to Home.      CODE STATUS:  FULL CODE       Patient to be discussed with Dr. Alan Lira, [address] Problems  Date Reviewed: 8/5/2016          Codes Class Noted POA    Atrial fibrillation with RVR [facility]) ICD-10-CM: I48.91  ICD-9-CM: 427.31  6/18/2016 Yes        Drug-seeking behavior (Chronic) ICD-10-CM: Z76.5  ICD-9-CM: 305.90 Chronic 5/10/2016 Yes    Overview Addendum 6/28/2016  4:38 PM by Zack Infante MD     [place]  shows inappropriate behavior. Charley Carranza  Female, 60 y.o., 1960  Last Weight:  285 lb 1.6 oz (129.321 kg)  Phone:  [phone]  PCP:  Linnette Mata  Language:  English  Need Interp:  No  AllergiesAspirin  Pcn [Penicillins]  Zetia [Ezetimibe]  Zithromax [Azithromycin]  Zofran [Ondansetron Hcl (Pf)]  Health Maintenance:  Due  FYIGeneral  Nov 2012 - Flowsheet Error  Primary Ins:  [employer]  MRN:  [id number]  MyChart:  Pending  Next Appt:  07/05/2016    Documentation (patient rec'd opiate scripts from 4 or more providers)        Altaf Correa MD (You)   1 hour ago   (2:57 PM)      Forwarding message of 6/23 to physician. Ya Machado   5 days ago   (10:39 AM)      Letter rec'd of [facility], Dept of [place], Drug Utilization Review Program., that patient has received opiate scripts from four or more prescribers.     Will be at  for review.                      Chronic narcotic dependence ([facility] [address] (Chronic) ICD-10-CM: F11.20  ICD-9-CM: 304.91  4/15/2016 Yes        Depression (Chronic) ICD-10-CM: F32.9  ICD-9-CM: 133  10/28/2015 Yes        * (Principal)COPD exacerbation ([facility] [address] ICD-10-CM: J44.1  ICD-9-CM: 491.21  10/27/2015 Yes        MARCO ANTONIO on CPAP ICD-10-CM: G47.33  ICD-9-CM: 327.23  5/6/2015 Yes    Overview Signed 5/6/2015 11:03 AM by Zack Infante MD     4 cm H2O per patient             Chronic back pain (Chronic) ICD-10-CM: M54.9, G89.29  ICD-9-CM: 724.5, 338.29  10/27/2014 Yes    Overview Signed 11/3/2015 11:25 AM by Poly Perera MD      11/2015:  Numerous pain pills including narcotics form several different MDs over the past year. We discussed this 11/3/2015. She will get all of her pain mediicnes from Dr. Harlan Roberto PMR from now on. i warned her not to get pain pills from others even if offered. Hypothyroidism (Chronic) ICD-10-CM: E03.9  ICD-9-CM: 244.9  4/18/2014 Yes    Overview Signed 11/10/2014  3:42 PM by Poly Perera MD     TSH > 100 11/2014. Synthroid adjusted to 100 mcg daily. Recheck in 3 months. Compliance?               GERD (gastroesophageal reflux disease) (Chronic) ICD-10-CM: K21.9  ICD-9-CM: 530.81  4/18/2014 Yes        Type II diabetes mellitus with complication, uncontrolled (HCC) (Chronic) ICD-10-CM: E11.8, E11.65  ICD-9-CM: 250.92  4/7/2014 Yes        HLD (hyperlipidemia) (Chronic) ICD-10-CM: P21.7  ICD-9-CM: 272.4  12/11/2013 Yes        Hypertension (Chronic) ICD-10-CM: I10  ICD-9-CM: 401.9  Unknown Yes        Obesity, Class III, BMI 40-49.9 (morbid obesity) ([facility] [address] (Chronic) ICD-10-CM: E66.01  ICD-9-CM: 278.01  3/19/2012 Yes

## 2017-02-17 NOTE — PROGRESS NOTES
[facility] Dosing Services: 2/17/2017    Consult for Warfarin Dosing by Dr. Barry Mendoza  Indication: Atrial Fibrillation. Therapy resumed from home: 2.5 mg on Mon,Tues,Wed,Thur,Fri and 5 mg on Sat and Sun  Dose to achieve an INR goal of 2-3     Order entered for Warfarin 2 mg to be given today at 2345. Significant drug interactions: Levaquin    Previous dose given 2.5 mg (given 2/17 @ 0100)   PT/INR Lab Results   Component Value Date/Time    INR 2.4 02/17/2017 05:02 AM    INR (POC) 1.0 08/16/2016 03:50 PM      Platelets Lab Results   Component Value Date/Time    PLATELET 552 20/74/8446 05:02 AM      H/H Lab Results   Component Value Date/Time    HGB 11.2 02/17/2017 05:02 AM        Pharmacy to follow daily and will provide subsequent Warfarin dosing based on clinical status.   Diamante Menendez [place])  Contact information [phone]

## 2017-02-17 NOTE — PROGRESS NOTES
VAT Note - Chart reviewed for PICC placement evaluation. Areas reviewed include, but are not limited to, patient's current and past H&P, Lab and Procedure Results, all notes, media records and medications. Per chart patient has ultrasound guided PIV placed in ER yesterday, not a PICC line. Will discuss with IR to determine best line for this patient in light of INR 2.4, GFR 47.

## 2017-02-17 NOTE — PROGRESS NOTES
5512  Bedside and Verbal shift change report given to Jordin Green RN (oncoming nurse) by Mei Waggoner RN (offgoing nurse). Report included the following information SBAR, Kardex, Med req.     0505  22 G R hand in place. 9408  Pt's right EJ came out while she was sleeping. RN will attempt peripheral IV.       0100  Primary Nurse Jake East RN and Floyd North RN performed a dual skin assessment on this patient Impairment noted- RLE abrasion  James score is 23      0020    TRANSFER - IN REPORT:    Verbal report received from [facility]  (name) on Deepti Ruelas  being received from ED (unit) for routine progression of care      Report consisted of patients Situation, Background, Assessment and   Recommendations(SBAR). Information from the following report(s) SBAR, Kardex, Intake/Output, MAR, Recent Results and Med Rec Status was reviewed with the receiving nurse. Opportunity for questions and clarification was provided. Assessment completed upon patients arrival to unit and care assumed.

## 2017-02-17 NOTE — PROGRESS NOTES
Problem: Mobility Impaired (Adult and Pediatric)  Goal: *Acute Goals and Plan of Care (Insert Text)  Physical Therapy Goals  Initiated 2/17/2017  1. Patient will transfer from bed to chair and chair to bed with modified independence using the least restrictive device within 7 day(s). 3. Patient will perform sit <> stand with modified independence within 7 day(s). 4. Patient will ambulate with modified independence for 60 feet with the least restrictive device within 7 day(s). 5. Patient will ascend/descend 4 stairs with 1 handrail(s) with modified independence within 7 day(s). PHYSICAL THERAPY EVALUATION  Patient: Maco Goins (2 y.o. female)  Date: 2/17/2017  Primary Diagnosis: COPD exacerbation ([address]        Precautions:  Fall (3L oxygen)      ASSESSMENT :  Based on the objective data described below, the patient presents with decreased tolerance for activity, decreased strength, and shortness of breath with minimal activity. Patient reports falls when out in community yesterday and she reports she does not have portable home oxygen. Fall prevention education provided and patient verbalizes understanding. Patient should benefit from in house rehab at [facility] and recommend follow up rehab at [facility] (pt choice)or follow up HHPT pending progress. Documentation for home O2:     ROOM AIR     AT REST    O2 SATS  96 HR  88   ROOM AIR WITH ACTIVITY 02 SATS  83 HR  98   (2, 3   ) LITERS OF O2 WITH ACTIVITY O2 SATS  96 HR  94   (3    )LITERS OF 02 PATIENT LEFT COMFORTABLY  SITTING/SUPINE 02 SATS  97 HR  98         Patient will benefit from skilled intervention to address the above impairments.   Patients rehabilitation potential is considered to be Guarded  Factors which may influence rehabilitation potential include:   [ ]         None noted  [ ]         Mental ability/status  [X]         Medical condition  [ ]         Home/family situation and support systems  [ ]         Safety awareness  [ ] Pain tolerance/management  [ ]         Other:        PLAN :  Recommendations and Planned Interventions:  [ ]           Bed Mobility Training             [ ]    Neuromuscular Re-Education  [X]           Transfer Training                   [ ]    Orthotic/Prosthetic Training  [X]           Gait Training                         [ ]    Modalities  [X]           Therapeutic Exercises           [ ]    Edema Management/Control  [X]           Therapeutic Activities            [X]    Patient and Family Training/Education  [ ]           Other (comment):     Frequency/Duration: Patient will be followed by physical therapy  5 times a week to address goals. Discharge Recommendations: Rehab vs HHPT follow up  Further Equipment Recommendations for Discharge: none       SUBJECTIVE:   Patient stated I feel worse than I did when I went into [facility] last Nov.      OBJECTIVE DATA SUMMARY:   HISTORY:    Past Medical History   Diagnosis Date    Arthritis      Asthma      Blindness of right eye with low vision in contralateral eye      Bronchitis      CAD (coronary artery disease)         MI yrs ago per patient    Cataracts, bilateral         Injections in rt eye    Cellulitis      Chronic kidney disease         stage 1 CKD    COPD (chronic obstructive pulmonary disease) ([facility]      Diastolic CHF, chronic (HCC)      GERD (gastroesophageal reflux disease)      Hypertension      Hypothyroidism      Migraines      Mitral stenosis         severe MAC and moderate mitral stenosis    Obesity, Class III, BMI 40-49.9 (morbid obesity) ([facility] 3/19/2012    On home oxygen therapy      Sleep apnea         CPAP    Spinal stenosis      TIA (transient ischemic attack) 2013       leg weakness, slurred speech.     Tobacco abuse 3/19/2012       quit    Type II or unspecified type diabetes mellitus without mention of complication, uncontrolled      UTI (urinary tract infection)       Past Surgical History   Procedure Laterality Date  Hx orthopaedic           spinal fusion 2012    Pr abdomen surgery proc unlisted           fibroids removed,     Hx hysterectomy        Hx colostomy        Hx endoscopy        Hx cataract removal Left      Hx cholecystectomy         Prior Level of Function/Home Situation: see above  Personal factors and/or comorbidities impacting plan of care:      Home Situation  Home Environment: Private residence  Wheelchair Ramp: No  One/Two Story Residence: One story  Living Alone: No  Support Systems: Child(bryson), Spouse/Significant Other/Partner  Patient Expects to be Discharged to[de-identified] Private residence  Current DME Used/Available at Home: Josy Mueller, [address] bed, Nebulizer, Oxygen, portable, Walker, rollator, Walker, rolling  Tub or Shower Type: Tub/Shower combination     EXAMINATION/PRESENTATION/DECISION MAKING:   Critical Behavior:  Neurologic State: Alert  Orientation Level: Oriented X4  Cognition: Appropriate decision making, Appropriate for age attention/concentration, Appropriate safety awareness, Follows commands  Safety/Judgement: Awareness of environment, Good awareness of safety precautions  Skin: not inspected  Strength:    Strength: Generally decreased, functional                    Tone & Sensation:   Tone: Normal              Sensation: Intact               Range Of Motion:  AROM: Generally decreased, functional           PROM: Generally decreased, functional           Coordination:  Coordination: Generally decreased, functional     Functional Mobility:  Bed Mobility:  Rolling: Modified independent  Supine to Sit: Modified independent  Sit to Supine: Modified independent  Scooting: Modified independent  Transfers:  Sit to Stand: Modified independent  Stand to Sit: Modified independent  Stand Pivot Transfers: Stand-by asssistance     Bed to Chair: Stand-by asssistance              Balance:   Sitting: Intact; With support  Standing: Impaired; With support  Standing - Static: Good  Standing - Dynamic : Fair  Ambulation/Gait Training:  Distance (ft): 40 Feet (ft) (x 2- seated rest break midway)  Assistive Device: Walker, rolling  Ambulation - Level of Assistance: Stand-by asssistance        Gait Abnormalities: Antalgic;Decreased step clearance        Base of Support: Center of gravity altered; Widened     Speed/Kaylan: Slow        Stairs - Level of Assistance:  (NT)                          Functional Measure:  Barthel Index:      Bathin  Bladder: 10  Bowels: 10  Groomin  Dressin  Feeding: 10  Mobility: 10  Stairs: 0  Toilet Use: 10  Transfer (Bed to Chair and Back): 10  Total: 70         Barthel and G-code impairment scale:  Percentage of impairment CH  0% CI  1-19% CJ  20-39% CK  40-59% CL  60-79% CM  80-99% CN  100%   Barthel Score 0-100 100 99-80 79-60 59-40 20-39 1-19    0   Barthel Score 0-20 20 17-19 13-16 9-12 5-8 1-4 0      The Barthel ADL Index: Guidelines  1. The index should be used as a record of what a patient does, not as a record of what a patient could do. 2. The main aim is to establish degree of independence from any help, physical or verbal, however minor and for whatever reason. 3. The need for supervision renders the patient not independent. 4. A patient's performance should be established using the best available evidence. Asking the patient, friends/relatives and nurses are the usual sources, but direct observation and common sense are also important. However direct testing is not needed. 5. Usually the patient's performance over the preceding 24-48 hours is important, but occasionally longer periods will be relevant. 6. Middle categories imply that the patient supplies over 50 per cent of the effort. 7. Use of aids to be independent is allowed. Harlee Cowden., Barthel, D.W. (2951). Functional evaluation: the Barthel Index. [address] [employer] (14)2. AVERY Miller, Luiz Yan, Jesse Jackson., Thera Cull, [address] [place] [address] ().  Measuring the change indisability after inpatient rehabilitation; comparison of the responsiveness of the Barthel Index and Functional [place] Measure. Journal of Neurology, Neurosurgery, and Psychiatry, 66(4), 721-820. ANURADHA Domingo.HILARY, ENOC Red, & Tania Posada M.A. (2004.) Assessment of post-stroke quality of life in cost-effectiveness studies: The usefulness of the Barthel Index and the EuroQoL-5D. Quality of Life Research, 13, 320-40         G codes: In compliance with CMSs Claims Based Outcome Reporting, the following G-code set was chosen for this patient based on their primary functional limitation being treated: The outcome measure chosen to determine the severity of the functional limitation was the Barthel Index with a score of 70/100 which was correlated with the impairment scale. · Mobility - Walking and Moving Around:               - CURRENT STATUS:    CJ - 20%-39% impaired, limited or restricted               - GOAL STATUS:           CI - 1%-19% impaired, limited or restricted               - D/C STATUS:                       ---------------To be determined---------------      Physical Therapy Evaluation Charge Determination   History Examination Presentation Decision-Making   HIGH Complexity :3+ comorbidities / personal factors will impact the outcome/ POC  LOW Complexity : 1-2 Standardized tests and measures addressing body structure, function, activity limitation and / or participation in recreation  LOW Complexity : Stable, uncomplicated  Other outcome measures Barthel Index LOW       Based on the above components, the patient evaluation is determined to be of the following complexity level: LOW      Pain:  Pain Scale 1: Visual  Pain Intensity 1: 0  Pain Location 1: Generalized  Pain Orientation 1: Anterior;Posterior  Pain Description 1: Aching  Pain Intervention(s) 1: Medication (see MAR)  Activity Tolerance:   poor  Please refer to the flowsheet for vital signs taken during this treatment.   After treatment:   [ ]         Patient left in no apparent distress sitting up in chair  [X]         Patient left in no apparent distress in bed  [X]         Call bell left within reach  [X]         Nursing notified  [X]         Caregiver present  [ ]         Bed alarm activated      COMMUNICATION/EDUCATION:   The patients plan of care was discussed with: Registered Nurse.  [X]         Fall prevention education was provided and the patient/caregiver indicated understanding. [X]         Patient/family have participated as able in goal setting and plan of care. [ ]         Patient/family agree to work toward stated goals and plan of care. [ ]         Patient understands intent and goals of therapy, but is neutral about his/her participation. [ ]         Patient is unable to participate in goal setting and plan of care.      Thank you for this referral.  Torin Solitario, PT   Time Calculation: 35 mins

## 2017-02-17 NOTE — PROGRESS NOTES
8:00 MD contacted regarding patient BS of 414 and K+ of 6.0,   8:03 New orders given; one time dose of Humalog 5 units and repeat BMP at 11 am to recheck K+.   8:06 Patient in bed resting quietly, with HOB elevated, asymptomatic, denies pain with no s/s of pain or distress noted. All safety measures in place, will continue to monitor.

## 2017-02-17 NOTE — PROGRESS NOTES
[facility] with [facility]     Resident Progress Note in Brief      Patient expressed wishes to see a provider closer to her home in [facility]. She initially states she has been seen by NP Christoph Thao. I contacted there office and she has not been seen there since 2014. She states she plans to follow up with their office and does not wish to be seen by [facility] in hospital if we aren't seeing her in clinic. Advised her we can manage her during this hospitalization, but in future will go to hospitalist group. Plan for convert medicine to PO and discharge tomorrow for HHPT or higher level of assistance. Incidentally noted she is very tired and states she doesn't get much sleep at home. She states she wears \"trilogy at home\" but it is \"too big to put in car\". Discussed with provider from her Pulmonary group and her Trilogy machine has been taken away due to lack of insurance as of January 2017. It is unclear if she is on anything currently for MARCO ANTONIO. Her previous out patient settings were 14/8.       Sara Reyes, DO   PGY-3

## 2017-02-18 VITALS
TEMPERATURE: 98.1 F | RESPIRATION RATE: 19 BRPM | SYSTOLIC BLOOD PRESSURE: 132 MMHG | WEIGHT: 242.51 LBS | HEART RATE: 78 BPM | DIASTOLIC BLOOD PRESSURE: 58 MMHG | HEIGHT: 59 IN | OXYGEN SATURATION: 95 % | BODY MASS INDEX: 48.89 KG/M2

## 2017-02-18 LAB
ALBUMIN SERPL BCP-MCNC: 3 G/DL (ref 3.5–5)
ALBUMIN/GLOB SERPL: 0.8 {RATIO} (ref 1.1–2.2)
ALP SERPL-CCNC: 92 U/L (ref 45–117)
ALT SERPL-CCNC: 42 U/L (ref 12–78)
ANION GAP BLD CALC-SCNC: 8 MMOL/L (ref 5–15)
AST SERPL W P-5'-P-CCNC: 16 U/L (ref 15–37)
BASOPHILS # BLD AUTO: 0 K/UL (ref 0–0.1)
BASOPHILS # BLD: 0 % (ref 0–1)
BILIRUB SERPL-MCNC: 0.2 MG/DL (ref 0.2–1)
BUN SERPL-MCNC: 35 MG/DL (ref 6–20)
BUN/CREAT SERPL: 29 (ref 12–20)
CALCIUM SERPL-MCNC: 8.5 MG/DL (ref 8.5–10.1)
CHLORIDE SERPL-SCNC: 98 MMOL/L (ref 97–108)
CO2 SERPL-SCNC: 29 MMOL/L (ref 21–32)
CREAT SERPL-MCNC: 1.21 MG/DL (ref 0.55–1.02)
EOSINOPHIL # BLD: 0 K/UL (ref 0–0.4)
EOSINOPHIL NFR BLD: 0 % (ref 0–7)
ERYTHROCYTE [DISTWIDTH] IN BLOOD BY AUTOMATED COUNT: 15.7 % (ref 11.5–14.5)
GLOBULIN SER CALC-MCNC: 3.7 G/DL (ref 2–4)
GLUCOSE BLD STRIP.AUTO-MCNC: 260 MG/DL (ref 65–100)
GLUCOSE BLD STRIP.AUTO-MCNC: 321 MG/DL (ref 65–100)
GLUCOSE BLD STRIP.AUTO-MCNC: 391 MG/DL (ref 65–100)
GLUCOSE SERPL-MCNC: 273 MG/DL (ref 65–100)
HCT VFR BLD AUTO: 32.8 % (ref 35–47)
HGB BLD-MCNC: 10.2 G/DL (ref 11.5–16)
INR PPP: 2.3 (ref 0.9–1.1)
LYMPHOCYTES # BLD AUTO: 7 % (ref 12–49)
LYMPHOCYTES # BLD: 1 K/UL (ref 0.8–3.5)
MCH RBC QN AUTO: 29.2 PG (ref 26–34)
MCHC RBC AUTO-ENTMCNC: 31.1 G/DL (ref 30–36.5)
MCV RBC AUTO: 94 FL (ref 80–99)
MONOCYTES # BLD: 0.6 K/UL (ref 0–1)
MONOCYTES NFR BLD AUTO: 4 % (ref 5–13)
NEUTS SEG # BLD: 13.1 K/UL (ref 1.8–8)
NEUTS SEG NFR BLD AUTO: 89 % (ref 32–75)
PLATELET # BLD AUTO: 306 K/UL (ref 150–400)
POTASSIUM SERPL-SCNC: 5.2 MMOL/L (ref 3.5–5.1)
PROT SERPL-MCNC: 6.7 G/DL (ref 6.4–8.2)
PROTHROMBIN TIME: 23.6 SEC (ref 9–11.1)
RBC # BLD AUTO: 3.49 M/UL (ref 3.8–5.2)
SERVICE CMNT-IMP: ABNORMAL
SODIUM SERPL-SCNC: 135 MMOL/L (ref 136–145)
WBC # BLD AUTO: 14.7 K/UL (ref 3.6–11)

## 2017-02-18 PROCEDURE — 74011636637 HC RX REV CODE- 636/637: Performed by: FAMILY MEDICINE

## 2017-02-18 PROCEDURE — 74011250637 HC RX REV CODE- 250/637: Performed by: FAMILY MEDICINE

## 2017-02-18 PROCEDURE — 94640 AIRWAY INHALATION TREATMENT: CPT

## 2017-02-18 PROCEDURE — 74011000250 HC RX REV CODE- 250: Performed by: FAMILY MEDICINE

## 2017-02-18 PROCEDURE — 80053 COMPREHEN METABOLIC PANEL: CPT | Performed by: FAMILY MEDICINE

## 2017-02-18 PROCEDURE — 85610 PROTHROMBIN TIME: CPT | Performed by: FAMILY MEDICINE

## 2017-02-18 PROCEDURE — 85025 COMPLETE CBC W/AUTO DIFF WBC: CPT | Performed by: FAMILY MEDICINE

## 2017-02-18 PROCEDURE — 82962 GLUCOSE BLOOD TEST: CPT

## 2017-02-18 PROCEDURE — 36415 COLL VENOUS BLD VENIPUNCTURE: CPT | Performed by: FAMILY MEDICINE

## 2017-02-18 PROCEDURE — 77010033678 HC OXYGEN DAILY

## 2017-02-18 PROCEDURE — 77030029684 HC NEB SM VOL KT MONA -A

## 2017-02-18 RX ORDER — CODEINE PHOSPHATE AND GUAIFENESIN 10; 100 MG/5ML; MG/5ML
5 SOLUTION ORAL
Qty: 1 BOTTLE | Refills: 0 | Status: SHIPPED | OUTPATIENT
Start: 2017-02-18

## 2017-02-18 RX ORDER — WARFARIN 2 MG/1
4 TABLET ORAL ONCE
Status: COMPLETED | OUTPATIENT
Start: 2017-02-18 | End: 2017-02-18

## 2017-02-18 RX ORDER — OXYCODONE AND ACETAMINOPHEN 7.5; 325 MG/1; MG/1
1 TABLET ORAL
Qty: 3 TAB | Refills: 0 | Status: SHIPPED | OUTPATIENT
Start: 2017-02-18

## 2017-02-18 RX ORDER — LEVOFLOXACIN 750 MG/1
750 TABLET ORAL EVERY 24 HOURS
Qty: 3 TAB | Refills: 0 | Status: SHIPPED | OUTPATIENT
Start: 2017-02-18 | End: 2017-02-21

## 2017-02-18 RX ORDER — PREDNISONE 20 MG/1
TABLET ORAL
Qty: 17 TAB | Refills: 0 | Status: SHIPPED | OUTPATIENT
Start: 2017-02-18

## 2017-02-18 RX ORDER — CODEINE PHOSPHATE AND GUAIFENESIN 10; 100 MG/5ML; MG/5ML
5 SOLUTION ORAL
Status: DISCONTINUED | OUTPATIENT
Start: 2017-02-18 | End: 2017-02-18 | Stop reason: HOSPADM

## 2017-02-18 RX ORDER — OXYCODONE AND ACETAMINOPHEN 7.5; 325 MG/1; MG/1
1 TABLET ORAL
Status: DISCONTINUED | OUTPATIENT
Start: 2017-02-18 | End: 2017-02-18 | Stop reason: HOSPADM

## 2017-02-18 RX ORDER — LEVOFLOXACIN 750 MG/1
750 TABLET ORAL EVERY 24 HOURS
Qty: 3 TAB | Refills: 0 | Status: SHIPPED | OUTPATIENT
Start: 2017-02-18 | End: 2017-02-18

## 2017-02-18 RX ADMIN — HYDROCODONE BITARTRATE AND ACETAMINOPHEN 1 TABLET: 5; 325 TABLET ORAL at 08:31

## 2017-02-18 RX ADMIN — DILTIAZEM HYDROCHLORIDE 240 MG: 120 CAPSULE, COATED, EXTENDED RELEASE ORAL at 08:06

## 2017-02-18 RX ADMIN — INSULIN LISPRO 7 UNITS: 100 INJECTION, SOLUTION INTRAVENOUS; SUBCUTANEOUS at 08:08

## 2017-02-18 RX ADMIN — IPRATROPIUM BROMIDE AND ALBUTEROL SULFATE 3 ML: .5; 2.5 SOLUTION RESPIRATORY (INHALATION) at 16:44

## 2017-02-18 RX ADMIN — INSULIN LISPRO 10 UNITS: 100 INJECTION, SOLUTION INTRAVENOUS; SUBCUTANEOUS at 12:27

## 2017-02-18 RX ADMIN — MONTELUKAST SODIUM 10 MG: 10 TABLET ORAL at 08:06

## 2017-02-18 RX ADMIN — CLONAZEPAM 1 MG: 1 TABLET ORAL at 17:24

## 2017-02-18 RX ADMIN — INSULIN LISPRO 10 UNITS: 100 INJECTION, SOLUTION INTRAVENOUS; SUBCUTANEOUS at 16:51

## 2017-02-18 RX ADMIN — PREDNISONE 60 MG: 20 TABLET ORAL at 08:06

## 2017-02-18 RX ADMIN — ATORVASTATIN CALCIUM 40 MG: 10 TABLET, FILM COATED ORAL at 08:06

## 2017-02-18 RX ADMIN — LEVOTHYROXINE SODIUM 175 MCG: 150 TABLET ORAL at 06:57

## 2017-02-18 RX ADMIN — GUAIFENESIN AND CODEINE PHOSPHATE 5 ML: 10; 100 LIQUID ORAL at 08:05

## 2017-02-18 RX ADMIN — OXYCODONE HYDROCHLORIDE AND ACETAMINOPHEN 1 TABLET: 7.5; 325 TABLET ORAL at 17:24

## 2017-02-18 RX ADMIN — HYDROCODONE BITARTRATE AND ACETAMINOPHEN 1 TABLET: 5; 325 TABLET ORAL at 03:25

## 2017-02-18 RX ADMIN — CLONIDINE HYDROCHLORIDE 0.2 MG: 0.1 TABLET ORAL at 08:06

## 2017-02-18 RX ADMIN — LEVOFLOXACIN 750 MG: 250 TABLET, FILM COATED ORAL at 17:24

## 2017-02-18 RX ADMIN — GUAIFENESIN AND CODEINE PHOSPHATE 5 ML: 10; 100 LIQUID ORAL at 12:41

## 2017-02-18 RX ADMIN — IPRATROPIUM BROMIDE AND ALBUTEROL SULFATE 3 ML: .5; 2.5 SOLUTION RESPIRATORY (INHALATION) at 04:17

## 2017-02-18 RX ADMIN — IPRATROPIUM BROMIDE AND ALBUTEROL SULFATE 3 ML: .5; 2.5 SOLUTION RESPIRATORY (INHALATION) at 08:30

## 2017-02-18 RX ADMIN — GUAIFENESIN AND CODEINE PHOSPHATE 5 ML: 10; 100 LIQUID ORAL at 17:50

## 2017-02-18 RX ADMIN — PANTOPRAZOLE SODIUM 40 MG: 40 TABLET, DELAYED RELEASE ORAL at 06:57

## 2017-02-18 RX ADMIN — Medication 10 ML: at 06:58

## 2017-02-18 RX ADMIN — WARFARIN SODIUM 4 MG: 2 TABLET ORAL at 16:51

## 2017-02-18 RX ADMIN — LISINOPRIL 5 MG: 5 TABLET ORAL at 08:06

## 2017-02-18 RX ADMIN — INSULIN GLARGINE 25 UNITS: 100 INJECTION, SOLUTION SUBCUTANEOUS at 08:08

## 2017-02-18 RX ADMIN — CLONAZEPAM 1 MG: 1 TABLET ORAL at 08:31

## 2017-02-18 RX ADMIN — OXYCODONE HYDROCHLORIDE AND ACETAMINOPHEN 1 TABLET: 7.5; 325 TABLET ORAL at 13:53

## 2017-02-18 RX ADMIN — GUAIFENESIN 600 MG: 600 TABLET, EXTENDED RELEASE ORAL at 08:06

## 2017-02-18 RX ADMIN — IPRATROPIUM BROMIDE AND ALBUTEROL SULFATE 3 ML: .5; 2.5 SOLUTION RESPIRATORY (INHALATION) at 12:36

## 2017-02-18 RX ADMIN — Medication 325 MG: at 06:57

## 2017-02-18 NOTE — DISCHARGE INSTRUCTIONS
HOME DISCHARGE INSTRUCTIONS    Vance Bauer / [id number] : 1960    Admission date: 2017 Discharge date: 2017     Please bring this form with you to show your care provider at your follow-up appointment. Primary care provider:  Johana De Dios NP    Discharging provider:  Amadou Traore MD  - Family Medicine Resident  Rosa Elena Hernandez DO - Attending, Family Medicine     You have been admitted to the hospital with the following diagnoses:    ACUTE DIAGNOSES:  COPD exacerbation ([facility] [address] [facility]  . . . . . . . . . . . . . . . . . . . . . . . . . . . . . . . . . . . . . . . . . . . . . . . . . . . . . . . . . . . . . . . . . . . . . . . Alyce Welch FOLLOW-UP CARE RECOMMENDATIONS:    Appointments  Follow-up Information     Follow up With Details Comments CYNTHIA Taylor Go on 2017 at 9 AM. for hospital follow up [address] [place] [address]  [phone]           Medications:   - Steroid Taper - Take 60 mg (3 tabs) daily for 2 days. Then 40 mg (2 tabs) daily for 3 days. Then 20 mg (1 tab) daily for 3 days. Then 10 mg (1/2 tab) daily for 3 days.  - Levaquin - Take 1 tab daily for 3 more days   - Robitussin AC - every 4 hours AS NEEDED    Follow-up tests needed: none     Pending test results: At the time of your discharge the following test results are still pending: none  Please make sure you review these results with your outpatient follow-up provider(s). Specific symptoms to watch for: chest pain, shortness of breath, fever, chills, nausea, vomiting, diarrhea, change in mentation, falling, weakness, bleeding. DIET/what to eat:  Diabetic Diet    ACTIVITY:  Activity as tolerated    Wound care: none    Equipment needed:  None     What to do if new or unexpected symptoms occur? If you experience any of the above symptoms (or should other concerns or questions arise after discharge) please call your primary care physician.  Return to the emergency room if you cannot get hold of your doctor. · It is very important that you keep your follow-up appointment(s). · Please bring discharge papers, medication list (and/or medication bottles) to your follow-up appointments for review by your outpatient provider(s). · Please check the list of medications and be sure it includes every medication (even non-prescription medications) that your provider wants you to take. · It is important that you take the medication exactly as they are prescribed. · Keep your medication in the bottles provided by the pharmacist and keep a list of the medication names, dosages, and times to be taken in your wallet. · Do not take other medications without consulting your doctor. · If you have any questions about your medications or other instructions, please talk to your nurse or care provider before you leave the hospital.     Information obtained by:     I understand that if any problems occur once I am at home I am to contact my physician. These instructions were explained to me and I had the opportunity to ask questions. I understand and acknowledge receipt of the instructions indicated above.                                                                                                                                                Physician's or R.N.'s Signature                                                                  Date/Time                                                                                                                                              Patient or Representative Signature                                                          Date/Time

## 2017-02-18 NOTE — PROGRESS NOTES
[facility] Dosing Services: 2/18/17    Consult for Warfarin Dosing by Dr. Natan Read  Indication: Atrial Fibrillation. Therapy resumed from home: 2.5 mg on Mon,Tues,Wed,Thur,Fri and 5 mg on Sat and Sun  Dose to achieve an INR goal of 2-3    Order entered for  Warfarin  4 (mg) ordered to be given today at 18:00. Significant drug interactions: levaquin  Previous dose given 2 mg   PT/INR Lab Results   Component Value Date/Time    INR 2.3 02/18/2017 07:01 AM    INR (POC) 1.0 08/16/2016 03:50 PM      Platelets Lab Results   Component Value Date/Time    PLATELET 201 38/14/2304 07:01 AM      H/H Lab Results   Component Value Date/Time    HGB 10.2 02/18/2017 07:01 AM        Pharmacy to follow daily and will provide subsequent Warfarin dosing based on clinical status.   Patrice Thomason  Contact information [phone]

## 2017-02-18 NOTE — PROGRESS NOTES
Patients blood glucose is 355 @ HS. Nicola Lane gave orders to administer 7 units of regular insulin.

## 2017-02-18 NOTE — PROGRESS NOTES
2/18/2017 5:30 PM Follow up conversation held with pt's RN. She has confirmed that this pt's portable O2 has arrived and that it is a potable tank. SW has also sent a referral and AVS to [facility]. SW notified them of this pt's discharge for today via [address]. Bev Hartman, [address]     2/18/2017 3:19 PM SW spoke with Md. Gini Mortimer spoke with pt. Updated orders for portable home O2 received. SW contacted [address] They report that they will deliver a tank to the hospital to get this pt home. They have not said whether it will be a small tank or not. Pt informed that she might not necessarily get a small tank.    Bev Hartman, BSW

## 2017-02-18 NOTE — CONSULTS
[facility]    DISCUSSION & [facility] Day: 3    Kenya Velez is a 59 y.o. BLACK OR  female whose history is given by a review of chart and the patient and is notable for a chief complaint / symptom of moderate continuous difficulty breathing that began several days ago. This symptom has been gradually worsening and is worse with any exercise but improved by rest.  ROS: SOB and Cough . Other than noted in the HPI above, a 12 point review of systems is negative for any other constitutional, opthalmologic, ENT, cardiovascular, pulmonary, gastrointestinal, urinary, neurologic, psychiatric, lymphatic, hematologic, oncologic,  integument or musculoskeletal issues. Consultation room 528    Patient is a morbidly obese 47-year-old -American female with admission diagnosis of COPD with community-acquired pneumonia with infiltrates in the left lower lobe with associated wheezing    She's been changed from parenteral steroids to oral and on oral quinolone with Robitussin for cough. She is on home oxygen at 3 L. Other issue with her is her elevated BMI causing hypercapnic hypoxic respiratory failure. She does have a Trilogy at home. Sleep apnea / obesity hypoventilation and she's followed by Dr. Jessica Pisano in our office. At this point I think is medically reasonable for her to be treated at home but she has multiple other psychiatric and medical issues that make transitioning home difficult and a high risk for coming back. At this point she is able to mobilize her secretions and does not consent to bronchoscopy. Recommending continue current therapy. Clearly her #1 medical health issue is her morbid obesity leading to a lot of the issues as outlined above including blood sugar, sleep apnea obesity hypoventilation and exacerbating her underlying obstructive lung disease.       I instructed the patient that I thought it was medically reasonable to go home on the regimen as outlined by the family practitioners however with all her other medical issues going on she is at high-risk for returning for multiple issues.         Patient Active Problem List   Diagnosis Code    Obesity, Class III, BMI 40-49.9 (morbid obesity) ([facility] E66.01    CAD (coronary artery disease) I25.10    Spinal stenosis M48.00    Hypertension I10    HLD (hyperlipidemia) E78.5    Type II diabetes mellitus with complication, uncontrolled ([facility]) E11.8, E11.65    Hypothyroidism E03.9    GERD (gastroesophageal reflux disease) K21.9    Left ventricular diastolic dysfunction, grade I I51.9    Lumbar post-laminectomy syndrome M96.1    Greater trochanteric bursitis M70.60    Chronic back pain M54.9, G89.29    Radicular syndrome of right leg M54.10    Trochanteric bursitis of right hip M70.61    Diarrhea R19.7    COPD (chronic obstructive pulmonary disease) with chronic bronchitis ([facility]) J44.9    MARCO ANTONIO on CPAP G47.33    Nonhealing ulcer of right lower leg ([facility]) L97.819    Cholecystitis K81.9    S/P cholecystectomy Z90.49    COPD exacerbation ([facility]) P58.4    Diastolic CHF, acute on chronic ([facility]) I50.33    Asthma exacerbation in COPD ([facility]) J44.1, J45.901    Depression F32.9    Hyponatremia E87.1    Avascular necrosis of bone of right hip ([facility]) M87.051    Chronic narcotic dependence ([facility]) F11.20    Elevated troponin R79.89    Lower abdominal pain R10.30    Drug-seeking behavior Z76.5    Poor venous access I87.8    Atrial fibrillation with RVR ([facility]) I48.91    Chronic obstructive pulmonary disease with acute exacerbation ([facility]) J44.1    Chest pain on breathing R07.1    GELACIO (acute kidney injury) ([facility]) N17.9    Essential hypertension with goal blood pressure less than 140/90 I10    Acquired hypothyroidism E03.9    Chronic bilateral low back pain without sciatica M54.5, G89.29    Mixed hyperlipidemia E78.2       Past Medical History   Diagnosis Date    Arthritis     Asthma  Blindness of right eye with low vision in contralateral eye     Bronchitis     CAD (coronary artery disease)      MI yrs ago per patient    Cataracts, bilateral      Injections in rt eye    Cellulitis     Chronic kidney disease      stage 1 CKD    COPD (chronic obstructive pulmonary disease) (HCC)     Diastolic CHF, chronic (HCC)     GERD (gastroesophageal reflux disease)     Hypertension     Hypothyroidism     Migraines     Mitral stenosis      severe MAC and moderate mitral stenosis    Obesity, Class III, BMI 40-49.9 (morbid obesity) ([facility] 3/19/2012    On home oxygen therapy     Sleep apnea      CPAP    Spinal stenosis     TIA (transient ischemic attack)      leg weakness, slurred speech.     Tobacco abuse 3/19/2012     quit    Type II or unspecified type diabetes mellitus without mention of complication, uncontrolled     UTI (urinary tract infection)             PHYSICAL      No intake or output data in the 24 hours ending 17 1309    Temp (24hrs), Av.9 °F (36.6 °C), Min:97.7 °F (36.5 °C), Max:98.1 °F (36.7 °C)       Visit Vitals    /65    Pulse 78    Temp 98 °F (36.7 °C)    Resp 19    Ht 4' 11\" (1.499 m)    Wt 110 kg (242 lb 8.1 oz)    SpO2 97%    BMI 48.98 kg/m2              General:    Nontoxic   No Distress      Eyes:  Anicteric    Noninjected     ENT:  Moist   No Thrush     Neck:  No Mass   Midline Trachea      CV:  Regular    No Murmur     LUNG: wheeze  Equal BS     GI:  NABS  Nontender     :  Clear Urine  Norm Genitalia      SKEL:  WD WN  No Clubbing     SKIN:  Perfused    No Drug Rash     NEURO:  A & O x 3  Non Focal     PSYCH:  Nonagitated  Good Insight        DATA    [x]  Reviewed: O2 / PAP / Ventilator  O2 Device: Nasal cannula (17 1236)    Ideal body weight: 48.8 kg (107 lb 8.4 oz)  Adjusted ideal body weight: 73.3 kg (161 lb 8.3 oz)                       Mode:                          Rate:           Tidal Volume:                  Pressure: FiO2:                       PEEP:                           PIP:     Minute Ventilation:       No results for input(s): PHI, PCO2I, PO2I in the last 72 hours. Recent Labs      02/18/17   0701   02/16/17   1854   WBC  14.7*   < >  11.3*   HGB  10.2*   < >  11.5   PLT  306   < >  318   INR  2.3*   < >  2.8*   APTT   --    --   30.8    < > = values in this interval not displayed. Recent Labs      02/18/17   0701   02/16/17   1854   NA  135*   < >  143   K  5.2*   < >  4.5   CL  98   < >  102   CO2  29   < >  37*   GLU  273*   < >  144*   BUN  35*   < >  30*   CREA  1.21*   < >  1.34*   CA  8.5   < >  9.0   LAC   --    --   1.8   ALB  3.0*   < >  3.5   SGOT  16   < >  44*   ALT  42   < >  59    < > = values in this interval not displayed. IMAGING     Results from Hospital Encounter encounter on 02/16/17   XR CHEST PA LAT   Narrative INDICATION: . dyspnea, pneumonia, compare to portable CXR  COMPARISON: Previous chest xray, yesterday and 1/26/2017. Limitations: Patient body habitus limits evaluation. Edie Rm FINDINGS: PA and lateral view of the chest.   .  Lines/tubes/surgical: None. Heart/mediastinum: Calcifications in the aortic arch. Lungs/pleura: Hazy opacity over both bases is at least partially explained by  overlying soft tissue density. There is no visible focal consolidation. There is  linear opacity in the left midlung. No visualized pleural effusion or  pneumothorax. Additional Comments: Degenerative changes in the spine. .       Impression IMPRESSION:  1. Linear scarring/atelectasis in the left midlung. Consolidation cannot be  entirely excluded. Recommend follow-up until resolution. XR CHEST PORT   Narrative EXAM:  XR CHEST PORT    INDICATION:  Increased SOB. Patient experiences chronic SOB due to h/o COPD and  is chronically on 3L O2 nasal cannula. Patient received neb treatments (x7)  today, but without any relief.  Patient additionally complains of fluid retention  in her \"face and hands,\" mild chest pain, and \"aching in her bones all over\"    COMPARISON:  Chest x-ray 1/26/2017. FINDINGS: A portable AP radiograph of the chest was obtained at 15:52 hours. The  patient is on a cardiac monitor. There is atelectasis versus infiltrate in the  left lower lung with otherwise grossly clear appearance to lungs. There is no  pleural effusion or pneumothorax evident. The cardiac silhouette remains  enlarged but appear stable with otherwise normal mediastinal silhouette. The  chest wall structures and visualized upper abdomen appear stable with no acute  interval change. Impression IMPRESSION: Left lower lung atelectasis versus pneumonic infiltrate. Results from [place] encounter on 01/26/17   XR CHEST PA LAT   Narrative EXAM:  XR CHEST PA LAT    INDICATION:   sob cough    COMPARISON: Chest x-ray 8/16/2016. Sweetie Ish FINDINGS: PA and lateral radiographs of the chest demonstrate grossly clear  appearance of lungs on examination compromised by poor penetration of patient  habitus. The cardia silhouette appears mildly enlarged. Atelectatic calcination  is affect the aortic arch. .  The chest wall structures and visualized upper  abdomen show no acute findings with incidental note of degenerative spine and  shoulder changes. Impression IMPRESSION: No acute findings on examination compromised by poor penetration of  patient habitus. Results from Hospital Encounter encounter on 02/16/17   CT HEAD WO CONT   Narrative EXAM:  CT HEAD WO CONT    INDICATION: Recent syncope, chronic COPD    COMPARISON: CT head on 7/13/2016    TECHNIQUE: Noncontrast head CT. Coronal and sagittal reformats. CT dose  reduction was achieved through the use of a standardized protocol tailored for  this examination and automatic exposure control for dose modulation. FINDINGS: The ventricles and sulci are age-appropriate without hydrocephalus.   There is no mass effect or midline shift. There is no intracranial hemorrhage or  extra-axial fluid collection. Patchy chronic microvascular ischemic disease is  unchanged. No CT evidence of acute infarct. The calvarium is intact. No evidence of acute sinusitis. Impression IMPRESSION:     No acute intracranial abnormality on this noncontrast head CT. No change. Results from [place] encounter on 07/13/16   CT HEAD WO CONT   Narrative **Final Report**      ICD Codes / Adm. Diagnosis: 270171  [id number] / Dizziness  Fall  Examination:  CT HEAD WO CON  - [id number] - Jul 13 2016 11:31AM  Accession No:  [id number]  Reason:  head injury on plavix      REPORT:  EXAM:  CT HEAD WITHOUT CONTRAST    INDICATION: Dizziness after fall with head injury on Plavix. COMPARISON: 5/10/2016. CONTRAST: None. TECHNIQUE: Unenhanced CT of the head was performed using 5 mm images. Brain   and bone windows were generated. Sagittal and coronal reformations were   generated. CT dose reduction was achieved through use of a standardized   protocol tailored for this examination and automatic exposure control for   dose modulation. CT dose reduction was achieved through use of a   standardized protocol tailored for this examination and automatic exposure   control for dose modulation. FINDINGS:  The ventricles and sulci are normal in size, shape and configuration and   midline. There is no significant white matter disease. There is no   intracranial hemorrhage. There is no extra-axial collection, mass, mass   effect or midline shift. The basilar cisterns are open. No acute infarct   is identified. The bone windows demonstrate no abnormalities. The   visualized portions of the paranasal sinuses and mastoid air cells are clear. IMPRESSION: Normal unenhanced CT examination of the head.            Signing/Reading Doctor: Marlene Fowler ([id number])    Approved: Marlene Fowler ([id number])  Jul 13 2016 11:39AM MEDICAL HISTORY    PMH:  has a past medical history of Arthritis; Asthma; Blindness of right eye with low vision in contralateral eye; Bronchitis; CAD (coronary artery disease); Cataracts, bilateral; Cellulitis; Chronic kidney disease; COPD (chronic obstructive pulmonary disease) ([facility] Diastolic CHF, chronic (HCC); GERD (gastroesophageal reflux disease); Hypertension; Hypothyroidism; Migraines; Mitral stenosis; Obesity, Class III, BMI 40-49.9 (morbid obesity) ([facility] (3/19/2012); On home oxygen therapy; Sleep apnea; Spinal stenosis; TIA (transient ischemic attack) (2013); Tobacco abuse (3/19/2012); Type II or unspecified type diabetes mellitus without mention of complication, uncontrolled; and UTI (urinary tract infection). She also has no past medical history of Nausea & vomiting. PSH:  has a past surgical history that includes orthopaedic; abdomen surgery proc unlisted; hysterectomy; colostomy; endoscopy; cataract removal (Left); and cholecystectomy. FHX: family history includes Asthma in her mother; Diabetes in her mother; Hypertension in her mother; Other in her sister; Other (age of onset: [address] [place]) in her father; Sickle Cell Anemia (age of onset: 16) in her brother. SHX:  reports that she quit smoking about 6 years ago. She has a 14.00 pack-year smoking history. She has never used smokeless tobacco. She reports that she does not drink alcohol or use illicit drugs. ALLERGY   Allergies   Allergen Reactions    Aspirin Hives, Shortness of Breath and Swelling     Throat swells up.  Does not use aleve or ibuprofen because of this    Pcn [Penicillins] Hives and Shortness of Breath     Is not sure if she has ever used Cephs    Zetia [Ezetimibe] Shortness of Breath     \"Throat closes up all the way\"    Zithromax [Azithromycin] Hives and Shortness of Breath     \" [place] closes up \"    Zofran [Ondansetron Hcl (Pf)] Hives and Itching     Tolerates promethazine      MEDICINES   Current Facility-Administered Medications   Medication    guaiFENesin-codeine (ROBITUSSIN AC) 100-10 mg/5 mL solution 5 mL    warfarin (COUMADIN) tablet 4 mg    oxyCODONE-acetaminophen (PERCOCET 7.5) 7.5-325 mg per tablet 1 Tab    insulin lispro (HUMALOG) injection    glucose chewable tablet 16 g    dextrose (D50W) injection syrg 12.5-25 g    glucagon (GLUCAGEN) injection 1 mg    guaiFENesin SR (MUCINEX) tablet 600 mg    levoFLOXacin (LEVAQUIN) tablet 750 mg    predniSONE (DELTASONE) tablet 60 mg    Warfarin pharmacy to dose    sodium chloride (NS) flush 5-10 mL    sodium chloride (NS) flush 5-10 mL    sodium chloride (NS) flush 5-10 mL    sodium chloride (NS) flush 5-10 mL    atorvastatin (LIPITOR) tablet 40 mg    clonazePAM (KlonoPIN) tablet 1 mg    cloNIDine HCl (CATAPRES) tablet 0.2 mg    dilTIAZem CD (CARDIZEM CD) capsule 240 mg    [START ON 2/20/2017] ergocalciferol (ERGOCALCIFEROL) capsule 50,000 Units    ferrous sulfate tablet 325 mg    HYDROcodone-acetaminophen (NORCO) 5-325 mg per tablet 1 Tab    insulin glargine (LANTUS) injection 25 Units    levothyroxine (SYNTHROID) tablet 175 mcg    lisinopril (PRINIVIL, ZESTRIL) tablet 5 mg    montelukast (SINGULAIR) tablet 10 mg    pantoprazole (PROTONIX) tablet 40 mg    temazepam (RESTORIL) capsule 30 mg    traZODone (DESYREL) tablet 50 mg    albuterol-ipratropium (DUO-NEB) 2.5 MG-0.5 MG/3 ML       Kareem Mitchell MD [facility]

## 2017-02-18 NOTE — PROGRESS NOTES
[facility] RESIDENCY PROGRAM  PROGRESS NOTE     2/18/2017  PCP: Artur Grant NP     Assessment/Plan:   Hospital Day: 3  Minerva Santizo is a 59 y.o. female who is admitted for COPD Exacerbation.     COPD with CAP: Improving, LLL effusion on CXR. Wheezing resolved, good air movement.  -Solumedrol weaned to PO prednisone today. - Pulm consult requested, appreciate assistance with taper and follow up. -Duonebs q4hrs  - Levaquin 750mg q24 for 5 days,   - Added Robitussin AC for cough. - O2 therapy: SpO2 88-94%, Currently on home O2 @3L. - Need for Home Health PT and Acapella usage. Consider OP Pulm Rehab.     Hypertension; Stable. -Continue home Catapres and Lisinopril     DM; HgA1C 7.8 on 08/16/16. Poor control likely response to steroids. Will increase Lantus based on ISS requirements. - Humalog and Lantus 25 units qDay  - POC Glucose checks  - ISS     Chronic AFib; Stable  - Continue Warfarin and Cardizem     Hyperlipidemia; Lipid Panel on 4/15/16 Tchol: 176 LDL: 74 HDL: 90 Trigs: 60  -Continue Lipitor QD     Hypothyroidism: Last TSH on 6/18/16: 4.89   - Continue home dose Levothyroxine 175 mcg qd   - Follow up with PCP on discharge     Hx of Asthma - Current presentation not suspected by an asthma exacerbation  - Continue home Montelukast, and current COPD treatment     Chronic Anemia - Stable  - Continue iron supplementation     Chronic Back Pain  - Continue home Norco     Vit D Deficiency - Stable  - Continue Vit D supplementation     GERD   - Continue Protonix     Depression and Anxiety  - Trazodone, Klonopin, Restoril     Obesity - POA BMI 46.45  - Patient was counseled on the benefits and effects of a healthy lifestyle and weight loss on his recovery and in having a better health overall. Information on weight management and obesity will be provided in the discharge package.      FEN/GI - Diabetic Diet.    Activity - UP AD JOSE C  DVT prophylaxis - Warfarin, SCDs  GI prophylaxis - Protonix  Disposition - Plan to d/c to Home.      CODE STATUS:  FULL CODE    Pt to be discussed with Dr. Basurto Speaker     Subjective:   \"Still coughing\"    Pt was seen and examined at bedside. Concerns overnight include: Would like to be cleared by her Pulmonologist and her cough to be addressed. She is in process of getting appointment with her PCP, CYNTHIA Bustillos near her home in [place]. Denies chest pain, SOB, nausea, vomiting, abdominal pain, dizziness. Objective:   Physical examination  Visit Vitals    /77 (BP 1 Location: Right arm, BP Patient Position: At rest)    Pulse 79    Temp 97.7 °F (36.5 °C)    Resp 20    Ht 4' 11\" (1.499 m)    Wt 242 lb 8.1 oz (110 kg)    LMP 1985    SpO2 97%    BMI 48.98 kg/m2      Temp (24hrs), Av.1 °F (36.7 °C), Min:97.7 °F (36.5 °C), Max:98.9 °F (37.2 °C)     O2 Flow Rate (L/min): 3 l/min   O2 Device: Nasal cannula    Physical Exam    General: No acute distress. Alert. Cooperative. Obese   Head: Normocephalic. Atraumatic. Neck: Supple. Normal ROM. No stiffness. Respiratory: No labored breathing, speaking in full sentences without dyspnea, no wheezes or focal LR findings, good air movement in all anterior and posterior lung fields. Cardiovascular: RRR. Normal S1,S2. No m/r/g. Pulses 2+ throughout. GI: + bowel sounds. Nontender. No rebound tenderness or guarding. Nondistended. Extremities: No edema. No palpable cord. No tenderness. Musculoskeletal: Full ROM in all extremities. Neuro: CN II-XII grossly intact. Strength 5/5 in all extremities. Sensation intact in all extremities. DTRs 2+ throughout. Skin: Clear. No rashes. No ulcers.           Data Review:     Recent Labs      17   0701  17   0502  17   1854   WBC  14.7*  11.3*  11.3*   HGB  10.2*  11.2*  11.5   HCT  32.8*  34.9*  35.6   PLT  306  306  318     Recent Labs      17   0701  17   1213  17   0502  17   1854   NA  135*  135*  135*  143   K 5. 2*  5.8*  6.0*  4.5   CL  98  97  97  102   CO2  29  30  31  37*   GLU  273*  298*  414*  144*   BUN  35*  33*  31*  30*   CREA  1.21*  1.19*  1.40*  1.34*   CA  8.5  8.6  8.6  9.0   ALB  3.0*   --   3.0*  3.5   TBILI  0.2   --   0.3  0.2   SGOT  16   --   42*  44*   ALT  42   --   60  59   INR  2.3*   --   2.4*  2.8*     Medications reviewed  Current Facility-Administered Medications   Medication Dose Route Frequency    guaiFENesin-codeine (ROBITUSSIN AC) 100-10 mg/5 mL solution 5 mL  5 mL Oral Q4H PRN    insulin lispro (HUMALOG) injection   SubCUTAneous AC&HS    glucose chewable tablet 16 g  4 Tab Oral PRN    dextrose (D50W) injection syrg 12.5-25 g  12.5-25 g IntraVENous PRN    glucagon (GLUCAGEN) injection 1 mg  1 mg IntraMUSCular PRN    guaiFENesin SR (MUCINEX) tablet 600 mg  600 mg Oral Q12H    levoFLOXacin (LEVAQUIN) tablet 750 mg  750 mg Oral Q24H    predniSONE (DELTASONE) tablet 60 mg  60 mg Oral DAILY WITH BREAKFAST    Warfarin pharmacy to dose   Other Rx Dosing/Monitoring    sodium chloride (NS) flush 5-10 mL  5-10 mL IntraVENous Q8H    sodium chloride (NS) flush 5-10 mL  5-10 mL IntraVENous PRN    sodium chloride (NS) flush 5-10 mL  5-10 mL IntraVENous Q8H    sodium chloride (NS) flush 5-10 mL  5-10 mL IntraVENous PRN    atorvastatin (LIPITOR) tablet 40 mg  40 mg Oral DAILY    clonazePAM (KlonoPIN) tablet 1 mg  1 mg Oral BID PRN    cloNIDine HCl (CATAPRES) tablet 0.2 mg  0.2 mg Oral BID    dilTIAZem CD (CARDIZEM CD) capsule 240 mg  240 mg Oral DAILY    [START ON 2/20/2017] ergocalciferol (ERGOCALCIFEROL) capsule 50,000 Units  50,000 Units Oral every Monday    ferrous sulfate tablet 325 mg  325 mg Oral ACB    HYDROcodone-acetaminophen (NORCO) 5-325 mg per tablet 1 Tab  1 Tab Oral Q4H PRN    insulin glargine (LANTUS) injection 25 Units  25 Units SubCUTAneous DAILY    levothyroxine (SYNTHROID) tablet 175 mcg  175 mcg Oral ACB    lisinopril (PRINIVIL, ZESTRIL) tablet 5 mg  5 mg Oral DAILY    montelukast (SINGULAIR) tablet 10 mg  10 mg Oral DAILY    pantoprazole (PROTONIX) tablet 40 mg  40 mg Oral ACB    temazepam (RESTORIL) capsule 30 mg  30 mg Oral QHS PRN    traZODone (DESYREL) tablet 50 mg  50 mg Oral QHS    albuterol-ipratropium (DUO-NEB) 2.5 MG-0.5 MG/3 ML  3 mL Nebulization Q4H RT         Signed:   Warden Skyler DO   Resident, [facility] Problems  Date Reviewed: 8/5/2016          Codes Class Noted POA    Atrial fibrillation with RVR ([facility] ICD-10-CM: I48.91  ICD-9-CM: 427.31  6/18/2016 Yes        Drug-seeking behavior (Chronic) ICD-10-CM: Z76.5  ICD-9-CM: 305.90 Chronic 5/10/2016 Yes    Overview Addendum 6/28/2016  4:38 PM by Zack Infante MD     [place]  shows inappropriate behavior. Charley Carranza  Female, 60 y.o., 1960  Last Weight:  285 lb 1.6 oz (129.321 kg)  Phone:  [phone]  PCP:  Linnette Mata  Language:  English  Need Interp:  No  AllergiesAspirin  Pcn [Penicillins]  Zetia [Ezetimibe]  Zithromax [Azithromycin]  Zofran [Ondansetron Hcl (Pf)]  Health Maintenance:  Due  FYIGeneral  Nov 2012 - Flowsheet Error  Primary Ins:  SHAGUFTA  MRN:  [id number]  MyChart:  Pending  Next Appt:  07/05/2016    Documentation (patient rec'd opiate scripts from 4 or more providers)        Altaf Correa MD (You)   1 hour ago   (2:57 PM)      Forwarding message of 6/23 to physician. Ya Machado   5 days ago   (10:39 AM)      Letter rec'd of [facility], Dept of [place], Drug Utilization Review Program., that patient has received opiate scripts from four or more prescribers.     Will be at  for review.                      Chronic narcotic dependence ([facility] (Chronic) ICD-10-CM: F11.20  ICD-9-CM: 304.91  4/15/2016 Yes        Depression (Chronic) ICD-10-CM: F32.9  ICD-9-CM: 256  10/28/2015 Yes        * (Principal)COPD exacerbation ([facility] ICD-10-CM: J44.1  ICD-9-CM: 491.21 10/27/2015 Yes        MARCO ANTONIO on CPAP ICD-10-CM: G47.33  ICD-9-CM: 327.23  5/6/2015 Yes    Overview Signed 5/6/2015 11:03 AM by Leticia Soler MD     4 cm H2O per patient             Chronic back pain (Chronic) ICD-10-CM: M54.9, G89.29  ICD-9-CM: 724.5, 338.29  10/27/2014 Yes    Overview Signed 11/3/2015 11:25 AM by Leticia Soler MD      11/2015:  Numerous pain pills including narcotics form several different MDs over the past year. We discussed this 11/3/2015. She will get all of her pain mediicnes from Dr. Tiara Austin PMR from now on. i warned her not to get pain pills from others even if offered. Hypothyroidism (Chronic) ICD-10-CM: E03.9  ICD-9-CM: 244.9  4/18/2014 Yes    Overview Signed 11/10/2014  3:42 PM by Leticia Soler MD     TSH > 100 11/2014. Synthroid adjusted to 100 mcg daily. Recheck in 3 months. Compliance?               GERD (gastroesophageal reflux disease) (Chronic) ICD-10-CM: K21.9  ICD-9-CM: 530.81  4/18/2014 Yes        Type II diabetes mellitus with complication, uncontrolled (HCC) (Chronic) ICD-10-CM: E11.8, E11.65  ICD-9-CM: 250.92  4/7/2014 Yes        HLD (hyperlipidemia) (Chronic) ICD-10-CM: C75.3  ICD-9-CM: 272.4  12/11/2013 Yes        Hypertension (Chronic) ICD-10-CM: I10  ICD-9-CM: 401.9  Unknown Yes        Obesity, Class III, BMI 40-49.9 (morbid obesity) ([facility] (Chronic) ICD-10-CM: E66.01  ICD-9-CM: 278.01  3/19/2012 Yes

## 2017-02-18 NOTE — DISCHARGE SUMMARY
[address] [facility] [address] [place] [address]   Office [phone], Fax [phone]        Discharge Summary     Patient: Bentley Sims       MRN: [id number]       YOB: 1960       Age: 59 y.o. Date of admission:  2/16/2017    Date of discharge:  2/18/2017    Primary care provider:  Travis Rios NP     Admitting provider:  Yanelis Stoo DO    Discharging provider(s): Keyla Ge MD - Family Medicine Resident  Kevyn Jay DO - Family Medicine Attending     Consultations  · Pulmonology    Procedures  · 2/17/2017 BLE Duplex    Discharge destination: to home. The patient is stable for discharge. Admission diagnosis  COPD exacerbation [facility])      Final discharge diagnoses and brief hospital course  As per admitting provider: Gladys Tripp is a 59 y.o. female Hx of COPD, CAD, chronic diastolic CHF, morbid obesity, HTN, asthma, hypothyroidism, CKD who presents to the ER complaining of SOB. In the afternoon, the patient started to have SOB for which she had 7 treatments with Nebulizer at home without any improvement. She also reports to have an epiosode of LOC, that she woke up on the ambulance on her way to the ED. She reports to have a dry cough for a few days that gave her some chest pain with every coughing spell. She is chronically on NC at 3 L/min at home. Denies any other medical concerns at this moment.      In the ER, vital signs were remarkable for /71, SatO2 98% on NC at 3 L/min. Labs were remarkable for WBC 11.3, Trop 0.04. CXR showed Left lower lung atelectasis versus pneumonic infiltrate. Pt was treated with DuoNebs, Solumedrol 125 mg, Levaquin. \"      Conditions treated during this hospitalization:  Bentley Sims is a 63yo F with hx morbid obesity, HTN, HLD, DM2, hypothyroid, gerd, chronic pain and drug seeking behavior who presents with increased shortness of breath and recent syncopal episode.       COPD exacerbation with CAP: COPD improved on IV solumedrol, Duonebs Q4H, and prn O2; PO prednisone taper was started day of discharge. Pt on 3L O2 NC on day of discharge. CXR showed LLL effusion; Levaquin IV was added for CAP. Robitussin AC for cough. Pulmonology was consulted and recommended continuing current treatment. - discharged home with steroid taper for total of 60mg x3d, 40mg x3d, 20mg x3d, and 10 mg x3d. - Levaquin 750 mg PO x3 days (for total of 5 days of treatment)  , LLL effusion on CXR. Wheezing resolved, good air movement. - Robitussin AC for cough. - pt set up with [facility] and portable home O2     Hypertension; Stable on home captapres and lisinopril  -resume home Catapres and Lisinopril      DM; poorly controlled during admission in setting of high dose steroids. HbA1C 7.8 (8/16). On Lantus and SSI. - resume Humalog and Lantus at home      Chronic AFib; Stable during admission on warfarin and Cardizem  - resume Warfarin and Cardizem at home      Hyperlipidemia; stable on home Lipitor. Lipid Panel (4/15/16) Tchol: 176 LDL: 74 HDL: 90 Trigs: 60  -Continue Lipitor daily at home       Hypothyroidism: stable on home Levothyroxine, TSH (6/18/16) 4.89   - Continue Levothyroxine 175 mcg daily at home   - Follow up with PCP on discharge      Hx of Asthma - not likely to be contributing to current COPD exacerbation. Continued on home montelukast  - resume Montelukast at home      Chronic Anemia - Stable, remained at baseline Hb 10-11 on iron supplementation.  No transfusions required  - Continue PO iron at home      Chronic Back Pain - pain controlled on percocet   - Continue home Norco  - pt requested 3 tabs of Percocet prior to discharge to \"get her through until her appointment\"       GERD - stable on protonix  - Continue Protonix      Depression and Anxiety - stable on home medications  - continue Trazodone, Klonopin, Restoril at home       Obesity - POA BMI 46.45  - Patient was counseled on the benefits and effects of a healthy lifestyle and weight loss on his recovery and in having a better health overall. Information on weight management and obesity will be provided in the discharge package. Labs/Imaging Needed on follow up: none    Pending test results: At the time of your discharge the following test results are still pending: none   Please make sure you review these results with your outpatient follow-up provider(s). Specific symptoms to watch for: chest pain, shortness of breath, fever, chills, nausea, vomiting, diarrhea, change in mentation, falling, weakness, bleeding. DIET/what to eat:  Diabetic Diet    ACTIVITY:  Activity as tolerated    Wound care: none    Equipment needed:  Portable home O2    Follow-up Care: Follow-up Information     Follow up With Details Comments CYNTHIA Taylor Go on 2/21/2017 at 9 AM. for hospital follow up [address] [place] [address]  [phone]            Physical examination at discharge  Visit Vitals    /58 (BP 1 Location: Right arm, BP Patient Position: At rest)    Pulse 78    Temp 98.1 °F (36.7 °C)    Resp 19    Ht 4' 11\" (1.499 m)    Wt 242 lb 8.1 oz (110 kg)    SpO2 95%    BMI 48.98 kg/m2      Physical Examination:   General: No acute distress. Alert. Cooperative. Obese   Head: Normocephalic. Atraumatic. Neck: Supple. Normal ROM. No stiffness. Respiratory: No labored breathing, speaking in full sentences without dyspnea, no wheezes or focal LR findings, good air movement in all anterior and posterior lung fields. Cardiovascular: RRR. Normal S1,S2. No m/r/g. Pulses 2+ throughout. GI: + bowel sounds. Nontender. No rebound tenderness or guarding. Nondistended. Extremities: No edema. No palpable cord. No tenderness. Musculoskeletal: Full ROM in all extremities. Neuro: CN II-XII grossly intact. Strength 5/5 in all extremities. Sensation intact in all extremities. DTRs 2+ throughout. Skin: Clear. No rashes. No ulcers. Recent Results (from the past 24 hour(s))   GLUCOSE, POC    Collection Time: 02/17/17  9:29 PM   Result Value Ref Range    Glucose (POC) 355 (H) 65 - 100 mg/dL    Performed by [facility] ([facility])    METABOLIC PANEL, COMPREHENSIVE    Collection Time: 02/18/17  7:01 AM   Result Value Ref Range    Sodium 135 (L) 136 - 145 mmol/L    Potassium 5.2 (H) 3.5 - 5.1 mmol/L    Chloride 98 97 - 108 mmol/L    CO2 29 21 - 32 mmol/L    Anion gap 8 5 - 15 mmol/L    Glucose 273 (H) 65 - 100 mg/dL    BUN 35 (H) 6 - 20 MG/DL    Creatinine 1.21 (H) 0.55 - 1.02 MG/DL    BUN/Creatinine ratio 29 (H) 12 - 20      GFR est AA 55 (L) >60 ml/min/1.73m2    GFR est non-AA 46 (L) >60 ml/min/1.73m2    Calcium 8.5 8.5 - 10.1 MG/DL    Bilirubin, total 0.2 0.2 - 1.0 MG/DL    ALT (SGPT) 42 12 - 78 U/L    AST (SGOT) 16 15 - 37 U/L    Alk. phosphatase 92 45 - 117 U/L    Protein, total 6.7 6.4 - 8.2 g/dL    Albumin 3.0 (L) 3.5 - 5.0 g/dL    Globulin 3.7 2.0 - 4.0 g/dL    A-G Ratio 0.8 (L) 1.1 - 2.2     CBC WITH AUTOMATED DIFF    Collection Time: 02/18/17  7:01 AM   Result Value Ref Range    WBC 14.7 (H) 3.6 - 11.0 K/uL    RBC 3.49 (L) 3.80 - 5.20 M/uL    HGB 10.2 (L) 11.5 - 16.0 g/dL    HCT 32.8 (L) 35.0 - 47.0 %    MCV 94.0 80.0 - 99.0 FL    MCH 29.2 26.0 - 34.0 PG    MCHC 31.1 30.0 - 36.5 g/dL    RDW 15.7 (H) 11.5 - 14.5 %    PLATELET 148 616 - 018 K/uL    NEUTROPHILS 89 (H) 32 - 75 %    LYMPHOCYTES 7 (L) 12 - 49 %    MONOCYTES 4 (L) 5 - 13 %    EOSINOPHILS 0 0 - 7 %    BASOPHILS 0 0 - 1 %    ABS. NEUTROPHILS 13.1 (H) 1.8 - 8.0 K/UL    ABS. LYMPHOCYTES 1.0 0.8 - 3.5 K/UL    ABS. MONOCYTES 0.6 0.0 - 1.0 K/UL    ABS. EOSINOPHILS 0.0 0.0 - 0.4 K/UL    ABS.  BASOPHILS 0.0 0.0 - 0.1 K/UL   PROTHROMBIN TIME + INR    Collection Time: 02/18/17  7:01 AM   Result Value Ref Range    INR 2.3 (H) 0.9 - 1.1      Prothrombin time 23.6 (H) 9.0 - 11.1 sec   GLUCOSE, POC    Collection Time: 02/18/17  7:44 AM   Result Value Ref Range    Glucose (POC) 260 (H) 65 - 100 mg/dL    Performed by [address] ([facility])    GLUCOSE, POC    Collection Time: 02/18/17 11:34 AM   Result Value Ref Range    Glucose (POC) 321 (H) 65 - 100 mg/dL    Performed by [facility] ([facility])    GLUCOSE, POC    Collection Time: 02/18/17  4:16 PM   Result Value Ref Range    Glucose (POC) 391 (H) 65 - 100 mg/dL    Performed by [address] (PCT)          Discharge Medication List as of 2/18/2017  5:30 PM      START taking these medications    Details   guaiFENesin-codeine (ROBITUSSIN AC) 100-10 mg/5 mL solution Take 5 mL by mouth every four (4) hours as needed for Cough. Max Daily Amount: 30 mL. Indications: COUGH, Print, Disp-1 Bottle, R-0      predniSONE (DELTASONE) 20 mg tablet Taper. 60 mg (3 tabs) daily for 2 days. 40 mg (2 tabs) daily for 3 days. 20 mg (1 tab) daily for 3 days. 10 mg (1/2 tab) daily for 3 days. , Print, Disp-17 Tab, R-0         CONTINUE these medications which have CHANGED    Details   levoFLOXacin (LEVAQUIN) 750 mg tablet Take 1 Tab by mouth every twenty-four (24) hours for 3 days. , Print, Disp-3 Tab, R-0         CONTINUE these medications which have NOT CHANGED    Details   HYDROcodone-acetaminophen (NORCO) 5-325 mg per tablet Take 1 Tab by mouth every four (4) hours as needed for Pain., Historical Med      oxyCODONE-acetaminophen (PERCOCET 7.5) 7.5-325 mg per tablet Take 1 Tab by mouth every four (4) hours as needed for Pain., Historical Med      ferrous sulfate 325 mg (65 mg iron) tablet Take 325 mg by mouth Daily (before breakfast). , Historical Med      montelukast (SINGULAIR) 10 mg tablet Take 10 mg by mouth daily. , Historical Med      pantoprazole (PROTONIX) 40 mg tablet Take 40 mg by mouth daily. , Historical Med      clonazePAM (KLONOPIN) 1 mg tablet Take 1 mg by mouth two (2) times a day., Historical Med      temazepam (RESTORIL) 30 mg capsule Take 30 mg by mouth nightly as needed for Sleep., Historical Med      traZODone (DESYREL) 50 mg tablet Take 50 mg by mouth nightly. , Historical Med      traMADol (ULTRAM) 50 mg tablet Take 50 mg by mouth every six (6) hours as needed for Pain., Historical Med      !! warfarin (COUMADIN) 5 mg tablet Take 2.5 mg by mouth. 5mg on Saturday and Sunday, 2.5mg Monday through Friday, Historical Med      !! warfarin (COUMADIN) 5 mg tablet Take 5 mg by mouth. 5mg on Saturday and Sunday, 2.5mg Monday through Friday, Historical Med      cloNIDine HCl (CATAPRES) 0.2 mg tablet Take 1 Tab by mouth two (2) times a day., Print, Disp-60 Tab, R-0      insulin glargine (LANTUS) 100 unit/mL injection 36 Units by SubCUTAneous route daily. , Print, Disp-1 Vial, R-0      furosemide (LASIX) 20 mg tablet Take 1 Tab by mouth as needed. Only when swelling, Print, Disp-14 Tab, R-0      albuterol-ipratropium (DUO-NEB) 2.5 mg-0.5 mg/3 ml nebu 3 mL by Nebulization route every four (4) hours as needed. , Print, Disp-3 mL, R-1      promethazine (PHENERGAN) 25 mg tablet Take 1 Tab by mouth every six (6) hours as needed. , Print, Disp-12 Tab, R-0      diltiazem CD (CARDIZEM CD) 240 mg ER capsule Take 1 Cap by mouth daily. , Print, Disp-60 Cap, R-0      albuterol (PROVENTIL HFA, VENTOLIN HFA, PROAIR HFA) 90 mcg/actuation inhaler Take 2 Puffs by inhalation four (4) times daily. , Historical Med      atorvastatin (LIPITOR) 40 mg tablet Take 40 mg by mouth daily. , Historical Med      levothyroxine (SYNTHROID) 175 mcg tablet Take 1 Tab by mouth Daily (before breakfast). , Normal, Disp-90 Tab, R-0      lisinopril (PRINIVIL, ZESTRIL) 5 mg tablet Take 1 Tab by mouth daily. , Print, Disp-30 Tab, R-1      ergocalciferol (ERGOCALCIFEROL) 50,000 unit capsule Take 50,000 Units by mouth every Monday., Historical Med      HUMALOG 100 unit/mL injection by SubCUTAneous route Before breakfast, lunch, dinner and at bedtime. Sliding scale, Historical Med, R-0      tiotropium (SPIRIVA) 18 mcg inhalation capsule Take 1 Cap by inhalation daily. , Print, Disp-30 Cap, R-0      fluticasone-salmeterol (ADVAIR) 500-50 mcg/dose diskus inhaler Take 1 puff by inhalation every twelve (12) hours. , Print, Disp-1 Inhaler, R-1       !! - Potential duplicate medications found. Please discuss with provider. Admission imaging studies:      Results from Hospital Encounter encounter on 02/16/17   XR CHEST PA LAT   Narrative INDICATION: . dyspnea, pneumonia, compare to portable CXR  COMPARISON: Previous chest xray, yesterday and 1/26/2017. Limitations: Patient body habitus limits evaluation. Javid Leslie FINDINGS: PA and lateral view of the chest.   .  Lines/tubes/surgical: None. Heart/mediastinum: Calcifications in the aortic arch. Lungs/pleura: Hazy opacity over both bases is at least partially explained by  overlying soft tissue density. There is no visible focal consolidation. There is  linear opacity in the left midlung. No visualized pleural effusion or  pneumothorax. Additional Comments: Degenerative changes in the spine. .       Impression IMPRESSION:  1. Linear scarring/atelectasis in the left midlung. Consolidation cannot be  entirely excluded. Recommend follow-up until resolution. Results from Hospital Encounter encounter on 02/16/17   CT HEAD WO CONT   Narrative EXAM:  CT HEAD WO CONT    INDICATION: Recent syncope, chronic COPD    COMPARISON: CT head on 7/13/2016    TECHNIQUE: Noncontrast head CT. Coronal and sagittal reformats. CT dose  reduction was achieved through the use of a standardized protocol tailored for  this examination and automatic exposure control for dose modulation. FINDINGS: The ventricles and sulci are age-appropriate without hydrocephalus. There is no mass effect or midline shift. There is no intracranial hemorrhage or  extra-axial fluid collection. Patchy chronic microvascular ischemic disease is  unchanged. No CT evidence of acute infarct. The calvarium is intact. No evidence of acute sinusitis.          Impression IMPRESSION:     No acute intracranial abnormality on this noncontrast head CT. No change.                -------------------------------------------------------------------------------------------------------------------    Chronic Diagnoses:    Problem List as of 2/18/2017  Date Reviewed: 8/5/2016          Codes Class Noted - Resolved    Chronic obstructive pulmonary disease with acute exacerbation ([facility] ICD-10-CM: J44.1  ICD-9-CM: 491.21  6/20/2016 - Present        Chest pain on breathing ICD-10-CM: R07.1  ICD-9-CM: 786.52  6/20/2016 - Present        GELACIO (acute kidney injury) ([facility] ICD-10-CM: N17.9  ICD-9-CM: 584.9  6/20/2016 - Present        Essential hypertension with goal blood pressure less than 140/90 ICD-10-CM: I10  ICD-9-CM: 401.9  6/20/2016 - Present        Acquired hypothyroidism ICD-10-CM: E03.9  ICD-9-CM: 244.9  6/20/2016 - Present        Chronic bilateral low back pain without sciatica ICD-10-CM: M54.5, G89.29  ICD-9-CM: 724.2, 338.29  6/20/2016 - Present        Mixed hyperlipidemia ICD-10-CM: E78.2  ICD-9-CM: 272.2  6/20/2016 - Present        Atrial fibrillation with RVR (HCC) ICD-10-CM: I48.91  ICD-9-CM: 427.31  6/18/2016 - Present        Drug-seeking behavior (Chronic) ICD-10-CM: Z76.5  ICD-9-CM: 305.90 Chronic 5/10/2016 - Present    Overview Addendum 6/28/2016  4:38 PM by Tatiana Bloch, MD     [facility] shows inappropriate behavior.       Brodie Bishop  Female, 60 y.o., 1960  Last Weight:  285 lb 1.6 oz (129.321 kg)  Phone:  [phone]  PCP:  Raj Pickard  Language:  English  Need Interp:  No  AllergiesAspirin  Pcn [Penicillins]  Zetia [Ezetimibe]  Zithromax [Azithromycin]  Zofran [Ondansetron Hcl (Pf)]  Health Maintenance:  Due  FYIGeneral  Nov 2012 - Flowsheet Error  Primary Ins:  [employer]  MRN:  [id number]  MyChart:  Pending  Next Appt:  07/05/2016    Documentation (patient rec'd opiate scripts from 4 or more providers)        Allyson Holden MD (You)   1 hour ago   (2:57 PM)      Forwarding message of 6/23 to physician. Ya Machado   5 days ago   (10:39 AM)      Letter rec'd of [facility], Dept of [employer], Drug Utilization Review Program., that patient has received opiate scripts from four or more prescribers.     Will be at  for review.                      Poor venous access ICD-10-CM: I87.8  ICD-9-CM: 459.89  5/10/2016 - Present        Elevated troponin ICD-10-CM: R79.89  ICD-9-CM: 790.6  5/9/2016 - Present        Lower abdominal pain ICD-10-CM: R10.30  ICD-9-CM: 789.09  5/9/2016 - Present        Chronic narcotic dependence (HCC) (Chronic) ICD-10-CM: F11.20  ICD-9-CM: 304.91  4/15/2016 - Present        Avascular necrosis of bone of right hip (HCC) ICD-10-CM: M87.051  ICD-9-CM: 733.42  10/30/2015 - Present        Hyponatremia ICD-10-CM: E87.1  ICD-9-CM: 276.1  10/29/2015 - Present        Depression (Chronic) ICD-10-CM: F32.9  ICD-9-CM: 756  10/28/2015 - Present        * (Principal)COPD exacerbation ([facility].) ICD-10-CM: J44.1  ICD-9-CM: 491.21  10/27/2015 - Present        Diastolic CHF, acute on chronic (HCC) ICD-10-CM: I50.33  ICD-9-CM: 428.33, 428.0  10/27/2015 - Present        Asthma exacerbation in COPD ([facility] 75.) ICD-10-CM: J44.1, J45.901  ICD-9-CM: 493.22  10/27/2015 - Present        S/P cholecystectomy ICD-10-CM: Z90.49  ICD-9-CM: V45.79  9/21/2015 - Present        Cholecystitis ICD-10-CM: K81.9  ICD-9-CM: 575.10  9/7/2015 - Present        Nonhealing ulcer of right lower leg ([facility] 75.) ICD-10-CM: L97.819  ICD-9-CM: 707.19  6/24/2015 - Present        MARCO ANTONIO on CPAP ICD-10-CM: G47.33  ICD-9-CM: 327.23  5/6/2015 - Present    Overview Signed 5/6/2015 11:03 AM by Elier Solo MD     4 cm H2O per patient             COPD (chronic obstructive pulmonary disease) with chronic bronchitis (HCC) (Chronic) ICD-10-CM: J44.9  ICD-9-CM: 491.20  3/9/2015 - Present        Diarrhea ICD-10-CM: R19.7  ICD-9-CM: 787.91  2/23/2015 - Present        Trochanteric bursitis of right hip ICD-10-CM: M70.61  ICD-9-CM: 726.5  11/10/2014 - Present        Lumbar post-laminectomy syndrome ICD-10-CM: M96.1  ICD-9-CM: 722.83  10/27/2014 - Present        Greater trochanteric bursitis ICD-10-CM: M70.60  ICD-9-CM: 726.5  10/27/2014 - Present        Chronic back pain (Chronic) ICD-10-CM: M54.9, G89.29  ICD-9-CM: 724.5, 338.29  10/27/2014 - Present    Overview Signed 11/3/2015 11:25 AM by Leonard Brown MD      11/2015:  Numerous pain pills including narcotics form several different MDs over the past year. We discussed this 11/3/2015. She will get all of her pain mediicnes from Dr. Colton Holbrook [facility]R from now on. i warned her not to get pain pills from others even if offered. Radicular syndrome of right leg ICD-10-CM: M54.10  ICD-9-CM: 724.4  10/27/2014 - Present        Left ventricular diastolic dysfunction, grade I (Chronic) ICD-10-CM: I51.9  ICD-9-CM: 429.9  7/11/2014 - Present        Hypothyroidism (Chronic) ICD-10-CM: E03.9  ICD-9-CM: 244.9  4/18/2014 - Present    Overview Signed 11/10/2014  3:42 PM by Leonard Brown MD     TSH > 100 11/2014. Synthroid adjusted to 100 mcg daily. Recheck in 3 months. Compliance?               GERD (gastroesophageal reflux disease) (Chronic) ICD-10-CM: K21.9  ICD-9-CM: 530.81  4/18/2014 - Present        Type II diabetes mellitus with complication, uncontrolled (HCC) (Chronic) ICD-10-CM: E11.8, E11.65  ICD-9-CM: 250.92  4/7/2014 - Present        HLD (hyperlipidemia) (Chronic) ICD-10-CM: E78.5  ICD-9-CM: 272.4  12/11/2013 - Present        CAD (coronary artery disease) (Chronic) ICD-10-CM: I25.10  ICD-9-CM: 414.00  Unknown - Present    Overview Addendum 1/6/2014  1:44 PM by Odell Lubin MD     Questionable MI yrs ago             Spinal stenosis ICD-10-CM: M48.00  ICD-9-CM: 724.00  Unknown - Present        Hypertension (Chronic) ICD-10-CM: I10  ICD-9-CM: 401.9  Unknown - Present        Obesity, Class III, BMI 40-49.9 (morbid obesity) ([facility] (Chronic) ICD-10-CM: E66.01  ICD-9-CM: 278.01  3/19/2012 - Present        RESOLVED: Accelerated hypertension ICD-10-CM: I10  ICD-9-CM: 401.0  4/14/2016 - 5/9/2016        RESOLVED: Asthma with acute exacerbation in adult ICD-10-CM: J45.901  ICD-9-CM: 493.92  4/14/2016 - 5/9/2016        RESOLVED: MARCO ANTONIO (obstructive sleep apnea) (Chronic) ICD-10-CM: G47.33  ICD-9-CM: 327.23  10/28/2015 - 5/9/2016        RESOLVED: GELACIO (acute kidney injury) ([facility] ICD-10-CM: N17.9  ICD-9-CM: 584.9  10/28/2015 - 5/9/2016        RESOLVED: CHF exacerbation ([facility] 75.) ICD-10-CM: I50.9  ICD-9-CM: 428.0  3/23/2015 - 5/9/2016        RESOLVED: Sleep apnea (Chronic) ICD-10-CM: G47.30  ICD-9-CM: 780.57  10/5/2012 - 5/9/2016        RESOLVED: Tobacco abuse (Chronic) ICD-10-CM: Z72.0  ICD-9-CM: 305.1  3/19/2012 - 10/7/2012                Signed:      Jung Paula MD   Family Medicine Resident      2/18/2017     Meenu Bryson DO   Family Medicine Attending

## 2017-02-18 NOTE — PROGRESS NOTES
Bedside and Verbal shift change report given to [employer] (oncoming nurse) by Hernandez Wall (offgoing nurse). Report included the following information SBAR, Kardex, ED Summary, Procedure Summary, Intake/Output, MAR, Recent Results and Med Rec Status.

## 2018-11-21 NOTE — ROUTINE PROCESS
It was reported this am that patient had picc line and it was reported to staff this am that patient pulled out access last night. Limited access with IV sticks, Labs, IV ABT, and Access is needed for patient stay at this time. Several attempts have been made to obtain IV access and all attempts unsuccessful.
patient

## 2020-09-24 NOTE — ED NOTES
[facility] noted both manual and Epic faxes failed to send, resent at 0845.    TRANSFER - OUT REPORT:    Verbal report given to Bereket Davidson RN (name) on Malathi Parent  being transferred to fifth floor room 528. (unit) for routine progression of care       Report consisted of patients Situation, Background, Assessment and   Recommendations(SBAR). Information from the following report(s) SBAR, Kardex, ED Summary, Procedure Summary, Intake/Output, MAR, Recent Results, Med Rec Status and Cardiac Rhythm Sinus Tachycardia was reviewed with the receiving nurse. Lines:   Peripheral IV 02/16/17 Right External jugular (Active)   Site Assessment Clean, dry, & intact 2/16/2017  7:04 PM   Phlebitis Assessment 0 2/16/2017  7:04 PM   Infiltration Assessment 0 2/16/2017  7:04 PM   Dressing Status Clean, dry, & intact 2/16/2017  7:04 PM   Dressing Type Non-adherent dressing 2/16/2017  7:04 PM   Hub Color/Line Status Pink 2/16/2017  7:04 PM   Action Taken Blood drawn 2/16/2017  7:04 PM        Opportunity for questions and clarification was provided.       Patient transported with:   Charlie App

## 2021-08-03 PROBLEM — I10 HYPERTENSION: Status: RESOLVED | Noted: 2021-08-03 | Resolved: 2021-08-03
